# Patient Record
Sex: MALE | Employment: FULL TIME | URBAN - METROPOLITAN AREA
[De-identification: names, ages, dates, MRNs, and addresses within clinical notes are randomized per-mention and may not be internally consistent; named-entity substitution may affect disease eponyms.]

---

## 2017-02-07 ENCOUNTER — TELEPHONE (OUTPATIENT)
Dept: FAMILY MEDICINE CLINIC | Facility: CLINIC | Age: 37
End: 2017-02-07

## 2017-02-07 DIAGNOSIS — Z23 NEED FOR TDAP VACCINATION: Primary | ICD-10-CM

## 2017-02-07 NOTE — TELEPHONE ENCOUNTER
Future Appointments  Date Time Provider Quin Keating   2/10/2017 2:30 PM EMG 03 NURSE EMG 3 EMG Isela

## 2017-02-07 NOTE — TELEPHONE ENCOUNTER
Pt sent his immunization record to us and he wants to make sure that he is updated on all shots and if he isnt he would like to make appt to have any shots he happens to need.

## 2017-02-10 ENCOUNTER — NURSE ONLY (OUTPATIENT)
Dept: FAMILY MEDICINE CLINIC | Facility: CLINIC | Age: 37
End: 2017-02-10

## 2017-02-10 PROCEDURE — 90471 IMMUNIZATION ADMIN: CPT | Performed by: FAMILY MEDICINE

## 2017-02-10 PROCEDURE — 90715 TDAP VACCINE 7 YRS/> IM: CPT | Performed by: FAMILY MEDICINE

## 2017-04-09 ENCOUNTER — OFFICE VISIT (OUTPATIENT)
Dept: FAMILY MEDICINE CLINIC | Facility: CLINIC | Age: 37
End: 2017-04-09

## 2017-04-09 VITALS
SYSTOLIC BLOOD PRESSURE: 110 MMHG | HEART RATE: 72 BPM | DIASTOLIC BLOOD PRESSURE: 68 MMHG | OXYGEN SATURATION: 98 % | WEIGHT: 186 LBS | TEMPERATURE: 98 F | RESPIRATION RATE: 16 BRPM | BODY MASS INDEX: 32 KG/M2

## 2017-04-09 DIAGNOSIS — J01.00 ACUTE NON-RECURRENT MAXILLARY SINUSITIS: Primary | ICD-10-CM

## 2017-04-09 PROCEDURE — 99213 OFFICE O/P EST LOW 20 MIN: CPT | Performed by: PHYSICIAN ASSISTANT

## 2017-04-09 RX ORDER — AMOXICILLIN AND CLAVULANATE POTASSIUM 875; 125 MG/1; MG/1
1 TABLET, FILM COATED ORAL 2 TIMES DAILY
Qty: 20 TABLET | Refills: 0 | Status: SHIPPED | OUTPATIENT
Start: 2017-04-09 | End: 2017-04-19

## 2017-04-09 RX ORDER — FLUTICASONE PROPIONATE 50 MCG
2 SPRAY, SUSPENSION (ML) NASAL DAILY
Qty: 1 BOTTLE | Refills: 0 | Status: SHIPPED | OUTPATIENT
Start: 2017-04-09 | End: 2017-05-09

## 2017-04-09 NOTE — PROGRESS NOTES
CHIEF COMPLAINT:   Patient presents with:  URI      HPI:   Lance Rooney is a 44year old male who presents for upper respiratory symptoms for  4 days. Patient reports: sinus congestion, a lot of sinus pain, mild sore throat, cough.   No whee NEURO: Denies headaches    EXAM:   /68 mmHg  Pulse 72  Temp(Src) 98 °F (36.7 °C)  Resp 16  Wt 186 lb  SpO2 98%  GENERAL: well developed, well nourished,in no apparent distress  SKIN: no rashes,no suspicious lesions  HEAD: atraumatic, normocephalic. You may eat yogurt or take probiotics over the counter (such as Florastor or Align) if you would like to replace the good bacteria in your GI tract that can be removed by antibiotics. If you do take probiotics, take them between doses of the antibiotic. Sinuses are air-filled spaces in the skull behind the face. They are kept moist and clean by a lining of mucosa. Things such as pollen, smoke, and chemical fumes can irritate the mucosa. It can then become inflamed (swell up).  As a response to irritation,

## 2017-04-24 ENCOUNTER — TELEPHONE (OUTPATIENT)
Dept: FAMILY MEDICINE CLINIC | Facility: CLINIC | Age: 37
End: 2017-04-24

## 2017-04-25 ENCOUNTER — TELEPHONE (OUTPATIENT)
Dept: FAMILY MEDICINE CLINIC | Facility: CLINIC | Age: 37
End: 2017-04-25

## 2017-04-25 ENCOUNTER — OFFICE VISIT (OUTPATIENT)
Dept: FAMILY MEDICINE CLINIC | Facility: CLINIC | Age: 37
End: 2017-04-25

## 2017-04-25 ENCOUNTER — HOSPITAL ENCOUNTER (OUTPATIENT)
Dept: GENERAL RADIOLOGY | Age: 37
Discharge: HOME | End: 2017-04-25
Attending: NURSE PRACTITIONER
Payer: COMMERCIAL

## 2017-04-25 VITALS
SYSTOLIC BLOOD PRESSURE: 118 MMHG | TEMPERATURE: 98 F | DIASTOLIC BLOOD PRESSURE: 80 MMHG | WEIGHT: 183.19 LBS | HEIGHT: 63.5 IN | RESPIRATION RATE: 16 BRPM | HEART RATE: 76 BPM | BODY MASS INDEX: 32.06 KG/M2

## 2017-04-25 DIAGNOSIS — K59.00 CONSTIPATION, UNSPECIFIED CONSTIPATION TYPE: Primary | ICD-10-CM

## 2017-04-25 DIAGNOSIS — K62.89 ANAL OR RECTAL PAIN: ICD-10-CM

## 2017-04-25 DIAGNOSIS — K59.00 CONSTIPATION, UNSPECIFIED CONSTIPATION TYPE: ICD-10-CM

## 2017-04-25 PROCEDURE — 74000 XR ABDOMEN (1 VIEW) (CPT=74000): CPT

## 2017-04-25 PROCEDURE — 99213 OFFICE O/P EST LOW 20 MIN: CPT | Performed by: NURSE PRACTITIONER

## 2017-04-25 NOTE — PROGRESS NOTES
Katie Cates is a 44year old male. S:  Patient presents today with spouse for c/o constipation and pain with defecation in the last 2-3 days.  States he had a firm, normal appearing BM 2 days ago, but now hard and having difficulty evacu Further evaluation for constipation due to pain. Complete abdominal xray today. Take aleve 440 mg twice a day with food for pain. Consider rectal steroid foam pending xray results. Recommend liquid/soft diet at this time.       Constipation (Adult)  Cons All treatment should be done after talking with your healthcare provider. This is especially true if you have another medical problems, are taking prescription medicines, or are an older adult. Treatment most often involves lifestyle changes.  You may also · Pain in your abdomen or back gets worse  · Nausea or vomiting  · Swelling in your abdomen  · Blood in the stool  · Black, tarry stool  · Involuntary weight loss  · Weakness  Date Last Reviewed: 12/30/2015  © 2794-5224 [employer]

## 2017-04-25 NOTE — TELEPHONE ENCOUNTER
They did do the enema he had some results just now feeling slightly better told her to continue the miralax with increased fluids and if not better in 2 days call for an appointment wife agreed to this plan

## 2017-04-25 NOTE — TELEPHONE ENCOUNTER
See visit note from 4/25/17 for instructions, additional MyChart message was sent from abdominal xray results. Advised to f/u in 2-3 days if worsening or no success for constipation treatment.

## 2017-04-25 NOTE — TELEPHONE ENCOUNTER
Patient was told to contact office after 30 minutes of taking medication to see if he has been able to have a bowel movement. Spouse states no bowel movements only pain.

## 2017-04-25 NOTE — PATIENT INSTRUCTIONS
Further evaluation for constipation due to pain. Complete abdominal xray today. Take aleve 440 mg twice a day with food for pain. Consider rectal steroid foam pending xray results. Recommend liquid/soft diet at this time.       Constipation (Adult)  Cons All treatment should be done after talking with your healthcare provider. This is especially true if you have another medical problems, are taking prescription medicines, or are an older adult. Treatment most often involves lifestyle changes.  You may also · Pain in your abdomen or back gets worse  · Nausea or vomiting  · Swelling in your abdomen  · Blood in the stool  · Black, tarry stool  · Involuntary weight loss  · Weakness  Date Last Reviewed: 12/30/2015  © 9703-6305 [employer]

## 2017-06-19 ENCOUNTER — HOSPITAL ENCOUNTER (OUTPATIENT)
Dept: GENERAL RADIOLOGY | Age: 37
Discharge: HOME | End: 2017-06-19
Attending: PHYSICIAN ASSISTANT
Payer: COMMERCIAL

## 2017-06-19 ENCOUNTER — OFFICE VISIT (OUTPATIENT)
Dept: FAMILY MEDICINE CLINIC | Facility: CLINIC | Age: 37
End: 2017-06-19

## 2017-06-19 VITALS
TEMPERATURE: 98 F | DIASTOLIC BLOOD PRESSURE: 80 MMHG | SYSTOLIC BLOOD PRESSURE: 110 MMHG | HEIGHT: 64 IN | HEART RATE: 72 BPM | BODY MASS INDEX: 31.58 KG/M2 | RESPIRATION RATE: 16 BRPM | WEIGHT: 185 LBS

## 2017-06-19 DIAGNOSIS — M75.41 SHOULDER IMPINGEMENT, RIGHT: ICD-10-CM

## 2017-06-19 DIAGNOSIS — M75.41 SHOULDER IMPINGEMENT, RIGHT: Primary | ICD-10-CM

## 2017-06-19 DIAGNOSIS — K59.00 CONSTIPATION, UNSPECIFIED CONSTIPATION TYPE: ICD-10-CM

## 2017-06-19 DIAGNOSIS — L98.9 SKIN LESION OF FOOT: ICD-10-CM

## 2017-06-19 PROCEDURE — 99214 OFFICE O/P EST MOD 30 MIN: CPT | Performed by: PHYSICIAN ASSISTANT

## 2017-06-19 PROCEDURE — 73030 X-RAY EXAM OF SHOULDER: CPT | Performed by: PHYSICIAN ASSISTANT

## 2017-06-19 RX ORDER — NAPROXEN 500 MG/1
500 TABLET ORAL 2 TIMES DAILY WITH MEALS
Qty: 60 TABLET | Refills: 0 | Status: SHIPPED | OUTPATIENT
Start: 2017-06-19 | End: 2017-07-17

## 2017-06-19 NOTE — PROGRESS NOTES
CC:  Patient presents with:  Shoulder Pain: right shoulder pain started moderately last week and has increased in pain, limiting range of motion  Constipation: problem started last week, bowel movements are strained and bm is pellet shaped  Derm Problem: b omega-3 fatty acids (FISH OIL) 1000 MG Oral Cap Take 1,000 mg by mouth 3 (three) times daily. Disp:  Rfl:      No current facility-administered medications on file prior to visit.     Review of Systems:     Constitutional: No fatigue; normal energy; no we constipation, including increasing fluids, activity, and fiber. He will use the Miralax for a week and F/U as needed. He will apply warm compresses to the lesion on his foot and watch for changes. F/U in 5-7 days if it does not resolve; sooner as needed.

## 2017-06-19 NOTE — PATIENT INSTRUCTIONS
Treating Constipation    Constipation is a common and often uncomfortable problem. Constipation means you have bowel movements fewer than 3 times per week, or strain to pass hard, dry stool. It can last a short time.  Or it can be a problem that never see © 0118-6149 The [address] [place] [address], [address] [place]. All rights reserved. This information is not intended as a substitute for professional medical care. Always follow your healthcare professional's instructions.         Constip All treatment should be done after talking with your healthcare provider. This is especially true if you have another medical problems, are taking prescription medicines, or are an older adult. Treatment most often involves lifestyle changes.  You may also · Pain in your abdomen or back gets worse  · Nausea or vomiting  · Swelling in your abdomen  · Blood in the stool  · Black, tarry stool  · Involuntary weight loss  · Weakness  Date Last Reviewed: 12/30/2015  © 3511-2994 [employer]

## 2017-06-20 DIAGNOSIS — M75.41 IMPINGEMENT SYNDROME OF RIGHT SHOULDER: Primary | ICD-10-CM

## 2017-07-19 RX ORDER — NAPROXEN 500 MG/1
TABLET ORAL
Qty: 60 TABLET | Refills: 0 | Status: SHIPPED | OUTPATIENT
Start: 2017-07-19 | End: 2017-11-27

## 2017-08-28 ENCOUNTER — TELEPHONE (OUTPATIENT)
Dept: FAMILY MEDICINE CLINIC | Facility: CLINIC | Age: 37
End: 2017-08-28

## 2017-08-28 NOTE — TELEPHONE ENCOUNTER
Pt scheduled nurse visit for tomorrow for Hep A and B. Patient would like to know if there's any other immunizations he needs to have done.

## 2017-08-29 ENCOUNTER — NURSE ONLY (OUTPATIENT)
Dept: FAMILY MEDICINE CLINIC | Facility: CLINIC | Age: 37
End: 2017-08-29

## 2017-08-29 VITALS — TEMPERATURE: 98 F

## 2017-08-29 PROCEDURE — 90471 IMMUNIZATION ADMIN: CPT | Performed by: FAMILY MEDICINE

## 2017-08-29 PROCEDURE — 90746 HEPB VACCINE 3 DOSE ADULT IM: CPT | Performed by: FAMILY MEDICINE

## 2017-08-29 PROCEDURE — 90472 IMMUNIZATION ADMIN EACH ADD: CPT | Performed by: FAMILY MEDICINE

## 2017-08-29 PROCEDURE — 90632 HEPA VACCINE ADULT IM: CPT | Performed by: FAMILY MEDICINE

## 2017-08-29 NOTE — PROGRESS NOTES
Thor Awan presents for his Hepatitis A&B bothe #1. Hepatitis A given in LD IM and Hepatitis B given in RD IM per  order. Both injections tolerated well. Given VIS.  Will return in 1 month for Hepatitis B #2 and in 6 months for Hepatitis A #2 and Hepa

## 2017-09-29 ENCOUNTER — TELEPHONE (OUTPATIENT)
Dept: FAMILY MEDICINE CLINIC | Facility: CLINIC | Age: 37
End: 2017-09-29

## 2017-09-29 ENCOUNTER — NURSE ONLY (OUTPATIENT)
Dept: FAMILY MEDICINE CLINIC | Facility: CLINIC | Age: 37
End: 2017-09-29

## 2017-09-29 DIAGNOSIS — E78.1 HYPERTRIGLYCERIDEMIA: Primary | ICD-10-CM

## 2017-09-29 DIAGNOSIS — Z23 NEED FOR VACCINATION: Primary | ICD-10-CM

## 2017-09-29 PROCEDURE — 90471 IMMUNIZATION ADMIN: CPT | Performed by: FAMILY MEDICINE

## 2017-09-29 PROCEDURE — 90746 HEPB VACCINE 3 DOSE ADULT IM: CPT | Performed by: FAMILY MEDICINE

## 2017-09-29 NOTE — TELEPHONE ENCOUNTER
Patient is scheduled 10/30/17 for physical, patient would like to have blood work done prior to appointment.  Please place orders to [facility]

## 2017-09-29 NOTE — PROGRESS NOTES
Pt received Hep  B injection today, pt handled it well, advise to make appointment for next injection.

## 2017-11-08 DIAGNOSIS — E78.1 HYPERTRIGLYCERIDEMIA: ICD-10-CM

## 2017-11-09 RX ORDER — SIMVASTATIN 20 MG
TABLET ORAL
Qty: 90 TABLET | Refills: 0 | Status: SHIPPED | OUTPATIENT
Start: 2017-11-09 | End: 2018-02-05

## 2017-11-09 NOTE — TELEPHONE ENCOUNTER
Ok for 3 mo supply but then should schedule physical and get labs prior to any refills.    Sooner is better, as we are already overdue for annual.

## 2017-11-20 ENCOUNTER — APPOINTMENT (OUTPATIENT)
Dept: LAB | Age: 37
End: 2017-11-20
Attending: FAMILY MEDICINE
Payer: COMMERCIAL

## 2017-11-20 DIAGNOSIS — E78.1 HYPERTRIGLYCERIDEMIA: ICD-10-CM

## 2017-11-20 PROCEDURE — 80053 COMPREHEN METABOLIC PANEL: CPT

## 2017-11-20 PROCEDURE — 36415 COLL VENOUS BLD VENIPUNCTURE: CPT

## 2017-11-20 PROCEDURE — 80061 LIPID PANEL: CPT

## 2017-11-27 ENCOUNTER — OFFICE VISIT (OUTPATIENT)
Dept: FAMILY MEDICINE CLINIC | Facility: CLINIC | Age: 37
End: 2017-11-27

## 2017-11-27 VITALS
TEMPERATURE: 99 F | HEIGHT: 63.5 IN | DIASTOLIC BLOOD PRESSURE: 70 MMHG | BODY MASS INDEX: 33.25 KG/M2 | HEART RATE: 76 BPM | RESPIRATION RATE: 16 BRPM | SYSTOLIC BLOOD PRESSURE: 102 MMHG | WEIGHT: 190 LBS

## 2017-11-27 DIAGNOSIS — R51.9 INTRACTABLE HEADACHE, UNSPECIFIED CHRONICITY PATTERN, UNSPECIFIED HEADACHE TYPE: Primary | ICD-10-CM

## 2017-11-27 DIAGNOSIS — Z00.00 ROUTINE GENERAL MEDICAL EXAMINATION AT A HEALTH CARE FACILITY: ICD-10-CM

## 2017-11-27 DIAGNOSIS — E78.1 PURE HYPERGLYCERIDEMIA: ICD-10-CM

## 2017-11-27 DIAGNOSIS — E78.1 HYPERTRIGLYCERIDEMIA: ICD-10-CM

## 2017-11-27 DIAGNOSIS — L98.9 SKIN DISORDER: ICD-10-CM

## 2017-11-27 PROCEDURE — 99213 OFFICE O/P EST LOW 20 MIN: CPT | Performed by: PHYSICIAN ASSISTANT

## 2017-11-27 PROCEDURE — 99395 PREV VISIT EST AGE 18-39: CPT | Performed by: PHYSICIAN ASSISTANT

## 2017-11-30 ENCOUNTER — HOSPITAL ENCOUNTER (OUTPATIENT)
Dept: CT IMAGING | Facility: HOSPITAL | Age: 37
Discharge: HOME | End: 2017-11-30
Attending: PHYSICIAN ASSISTANT
Payer: COMMERCIAL

## 2017-11-30 DIAGNOSIS — R51.9 INTRACTABLE HEADACHE, UNSPECIFIED CHRONICITY PATTERN, UNSPECIFIED HEADACHE TYPE: ICD-10-CM

## 2017-11-30 PROCEDURE — 70450 CT HEAD/BRAIN W/O DYE: CPT | Performed by: PHYSICIAN ASSISTANT

## 2017-12-04 ENCOUNTER — TELEPHONE (OUTPATIENT)
Dept: FAMILY MEDICINE CLINIC | Facility: CLINIC | Age: 37
End: 2017-12-04

## 2017-12-04 DIAGNOSIS — R51.9 INTRACTABLE HEADACHE, UNSPECIFIED CHRONICITY PATTERN, UNSPECIFIED HEADACHE TYPE: Primary | ICD-10-CM

## 2017-12-04 NOTE — TELEPHONE ENCOUNTER
Referral placed; please provide him with [facility] neuro contact info.

## 2017-12-04 NOTE — TELEPHONE ENCOUNTER
Mark Knife  [address]  [address] [place] [address]  [phone]      [facility] to call back to get referral name

## 2017-12-04 NOTE — TELEPHONE ENCOUNTER
----- Message from Angelita Pisano PA-C sent at 12/1/2017  8:49 AM CST -----  Negative CT. I will refer to neuro if he desires.

## 2018-01-16 ENCOUNTER — OFFICE VISIT (OUTPATIENT)
Dept: NEUROLOGY | Facility: CLINIC | Age: 38
End: 2018-01-16

## 2018-01-16 VITALS
HEIGHT: 64 IN | DIASTOLIC BLOOD PRESSURE: 78 MMHG | RESPIRATION RATE: 16 BRPM | WEIGHT: 198 LBS | HEART RATE: 84 BPM | SYSTOLIC BLOOD PRESSURE: 116 MMHG | BODY MASS INDEX: 33.8 KG/M2

## 2018-01-16 DIAGNOSIS — G44.219 EPISODIC TENSION-TYPE HEADACHE, NOT INTRACTABLE: Primary | ICD-10-CM

## 2018-01-16 PROCEDURE — 99204 OFFICE O/P NEW MOD 45 MIN: CPT | Performed by: OTHER

## 2018-01-16 RX ORDER — TOPIRAMATE 25 MG/1
TABLET ORAL
Qty: 60 TABLET | Refills: 1 | Status: SHIPPED | OUTPATIENT
Start: 2018-01-16 | End: 2018-03-05

## 2018-01-16 NOTE — PROGRESS NOTES
Neurology H&P    Quique 52 Patient Status:  No patient class for patient encounter    1980 MRN [id number]   Location [address] [facility], [address] [place] [address] Drive, [address] [place] [address] Attending No att. providers found   Hosp Day # 0 PCP José Luis Stevens Problem List:  Patient Active Problem List:     Skin disorder     Pure hyperglyceridemia     Hypertriglyceridemia      PMHx:  Past Medical History:   Diagnosis Date   • Localized superficial swelling, mass, or lump 7/6/12    multiple skin nodules   • elevation intact, no dysarthria, no tongue fasciculations or atrophy, strong shoulder shrug.     MOTOR EXAMINATION: normal tone, no fasciculations, normal strength throughout in UEs and LEs     SENSORY EXAMINATION:  UE: intact to light touch, pinprick intac type  - Topamax 25 mg PO QHS and we will increase this to 50mg QHS after 1 week       - We discussed possible side effects in detail       - Pt will call back to clinic in 5-10 days and let me know how they are tolerating this medication  - CTH imagine was

## 2018-01-16 NOTE — PATIENT INSTRUCTIONS
Refill policies:    • Allow 2-3 business days for refills; controlled substances may take longer.   • Contact your pharmacy at least 5 days prior to running out of medication and have them send an electronic request or submit request through the [facility] recommended that you have a procedure or additional testing performed. [facility]) will contact your insurance carrier to obtain pre-certification or prior authorization.     Unfortunately, [facility] has seen an increase in denial of paym

## 2018-02-05 ENCOUNTER — TELEPHONE (OUTPATIENT)
Dept: NEUROLOGY | Facility: CLINIC | Age: 38
End: 2018-02-05

## 2018-02-05 DIAGNOSIS — E78.1 HYPERTRIGLYCERIDEMIA: ICD-10-CM

## 2018-02-05 NOTE — TELEPHONE ENCOUNTER
S:  Pt calling to report he has started to experience tingling in his fingers since increasing his dose of topamax to 50mg nightly.   Tingling started as short burst about 4 days ago, but now has become more persistent and is interfering with normal functio

## 2018-02-06 RX ORDER — SIMVASTATIN 20 MG
TABLET ORAL
Qty: 90 TABLET | Refills: 1 | Status: SHIPPED | OUTPATIENT
Start: 2018-02-06 | End: 2018-07-12

## 2018-03-05 ENCOUNTER — OFFICE VISIT (OUTPATIENT)
Dept: NEUROLOGY | Facility: CLINIC | Age: 38
End: 2018-03-05

## 2018-03-05 VITALS
BODY MASS INDEX: 33 KG/M2 | SYSTOLIC BLOOD PRESSURE: 118 MMHG | HEART RATE: 81 BPM | WEIGHT: 192 LBS | DIASTOLIC BLOOD PRESSURE: 78 MMHG | RESPIRATION RATE: 16 BRPM

## 2018-03-05 DIAGNOSIS — G44.219 EPISODIC TENSION-TYPE HEADACHE, NOT INTRACTABLE: Primary | ICD-10-CM

## 2018-03-05 PROCEDURE — 99213 OFFICE O/P EST LOW 20 MIN: CPT | Performed by: OTHER

## 2018-03-05 RX ORDER — BUTALBITAL, ACETAMINOPHEN AND CAFFEINE 300; 40; 50 MG/1; MG/1; MG/1
1 CAPSULE ORAL EVERY 4 HOURS PRN
Qty: 20 CAPSULE | Refills: 0 | Status: SHIPPED | OUTPATIENT
Start: 2018-03-05 | End: 2018-03-19

## 2018-03-05 NOTE — PROGRESS NOTES
Neurology H&P    Quique Yang Patient Status:  No patient class for patient encounter    1980 MRN [id number]   Location ED [facility], [address], [address] [facility] Attending No att. providers found   Hosp Day # 0 PCP Bobby Atkins that this is usually due to lack of sleep.  He did try to take 50-mg PO QHS of his Topamax but tells me that he had a significant amount of tingling in his feet that accompanied this higher does and so reduced it to 25mg PO QHS and that this reduced any tin Genetic Disease Son      gene mutation - pelvic kidney   • Neurological Disorder Son      seizures       ROS:  Gen: no unexplained weight loss  Vision: no vision changes, no new blurry or double vision  Head and Neck: no eye or ear discharge  Pulmonary: no size.    INTRACRANIAL:  There are no abnormal extraaxial fluid collections. There is no midline shift. There are no intraparenchymal brain abnormalities. There is nothing specific for acute infarct. There is no hemorrhage or significant mass lesion.   Sushila Johnson DO  Neurology

## 2018-03-05 NOTE — PATIENT INSTRUCTIONS
Refill policies:    • Allow 2-3 business days for refills; controlled substances may take longer.   • Contact your pharmacy at least 5 days prior to running out of medication and have them send an electronic request or submit request through the [facility] recommended that you have a procedure or additional testing performed. [facility]) will contact your insurance carrier to obtain pre-certification or prior authorization.     Unfortunately, [facility] has seen an increase in denial of paym

## 2018-03-06 ENCOUNTER — TELEPHONE (OUTPATIENT)
Dept: NEUROLOGY | Facility: CLINIC | Age: 38
End: 2018-03-06

## 2018-03-06 NOTE — TELEPHONE ENCOUNTER
Rx for Fioricet printed and signed by Dr. Kari Georges after OV. Faxed to preferred pharmacy at above number. Fax confirmation received.

## 2018-03-07 ENCOUNTER — TELEPHONE (OUTPATIENT)
Dept: SURGERY | Facility: CLINIC | Age: 38
End: 2018-03-07

## 2018-03-07 NOTE — TELEPHONE ENCOUNTER
Pharmacist informed that they are requesting PA for Fioricet with Codeine and Fioricet was not ordered with Codeine. Fioricet without codeine does go through without needing a PA.

## 2018-04-19 NOTE — PROGRESS NOTES
[facility] Emergency Department    [address] [place] [address]    Phone:  [phone]    Fax:  [phone]                                       Tana Hernandez   MRN: [id number]    Department:  [facility] Emergency Department   Date of Visit:  4/19/2018           Patient Information     Date Of Birth          2000        Your diagnoses for this visit were:     Laceration of right lower extremity, initial encounter        You were seen by Romaine Vizcarra MD.      Follow-up Information     Follow up with Deana Wood APRN CNP In 1 week.    Specialty:  Nurse Practitioner - Family    Why:  ER follow up, For suture removal    Contact information:    [address] [facility]   [place] [address] [phone]          Discharge Instructions         Extremity Laceration: Stitches, Staples, or Tape  A laceration is a cut through the skin. If it is deep, it may require stitches or staples to close so it can heal. Minor cuts may be treated with surgical tape closures, or skin glue.  X-rays may be done if something may have entered the skin through the cut. You may also need a tetanus shot if you are not up to date on this vaccine.  Home care    Follow the healthcare provider s instructions on how to care for the cut.    Wash your hands with soap and warm water before and after caring for your wound. This is to help prevent infection.    Keep the wound clean and dry. If a bandage was applied and it becomes wet or dirty, replace it. Otherwise, leave it in place for the first 24 hours, then change it once a day or as directed.    If stitches or staples were used, clean the wound daily:  ? After removing the bandage, wash the area with soap and water. Use a wet cotton swab to loosen and remove any blood or crust that forms.  ? After cleaning, keep the wound clean and dry. Talk with your healthcare provider before putting any antibiotic ointment on the wound. Reapply the  Radha Phillip is a 44year old male who presents for a complete physical exam. He has a recurrent spot on his scalp that he has been to see dermatology for twice; both providers were in the same office and both prescribed the same medication, which has not helped. Value Date   TSH 1.41 12/17/2014   TSHT4 1.48 02/04/2014     No results found for: PSA      Current Outpatient Prescriptions:  omega-3 fatty acids (FISH OIL) 1000 MG Oral Cap Take 1,000 mg by mouth 3 (three) times daily.  Disp:  Rfl:    SIMVASTATIN 20 MG Or bandage.    You may remove the bandage to shower as usual after the first 24 hours, but don't soak the area in water (no swimming) until the stitches or staples are removed.    If surgical tape closures were used, keep the area clean and dry. If it becomes wet, blot it dry with a towel. Let the surgical tape fall off on its own.    The healthcare provider may prescribe an antibiotic cream or ointment to prevent infection. He or she may also prescribe an antibiotic pill. Don't stop taking this medicine until you have finished it all or the provider tells you to stop.    The provider may also prescribe medicine for pain. Follow the instructions for taking these medicines.    Don't do activities that may reopen your wound.  Follow-up care  Follow up with your healthcare provider, or as advised. Most skin wounds heal within 10 days. But an infection may sometimes occur even with proper treatment. Check the wound daily for the signs of infection listed below. Stitches and staples should be removed within 7 to14 days. If surgical tape closures were used, you may remove them after 10 days if they have not fallen off by then.   When to seek medical advice  Call your healthcare provider right away if any of these occur:    Wound bleeding not controlled by direct pressure    Signs of infection, including increasing pain in the wound, increasing wound redness or swelling, or pus or bad odor coming from the wound    Fever of 100.4 F (38 C) or higher, or as directed by your healthcare provider    Stitches or staples come apart or fall out or surgical tape falls off before 7 days    Wound edges reopen    Wound changes colors    Numbness occurs around the wound     Decreased movement around the injured area  Date Last Reviewed: 7/1/2017 2000-2017 The [employer]. [address] [place] [address]. All rights reserved. This information is not intended as a substitute for professional medical care. Always follow  thyromegaly  Lungs: Clear to auscultation w/o wheezes, rales, or rhonchi; no labored breathing  Cardio: RRR without murmur or extra heart sounds; no carotid bruits; no peripheral edema  GI: Normal BS; no hernias, HSM, or TTP; no abdominal bruits  : Defer your healthcare professional's instructions.          24 Hour Appointment Hotline       To make an appointment at any [facility], call [phone] ([phone]). If you don't have a family doctor or clinic, we will help you find one. [facility] clinics are conveniently located to serve the needs of you and your family.             Review of your medicines      Our records show that you are taking the medicines listed below. If these are incorrect, please call your family doctor or clinic.        Dose / Directions Last dose taken    amphetamine-dextroamphetamine 10 MG per tablet   Commonly known as:  ADDERALL   Dose:  10 mg   Quantity:  60 tablet        Take 1 tablet (10 mg) by mouth 2 times daily   Refills:  0        FLUoxetine 10 MG capsule   Commonly known as:  PROzac   Dose:  10 mg        10 mg 1 capsule by mouth daily   Refills:  1                Orders Needing Specimen Collection     None      Pending Results     No orders found from 4/17/2018 to 4/20/2018.            Pending Culture Results     No orders found from 4/17/2018 to 4/20/2018.            Pending Results Instructions     If you had any lab results that were not finalized at the time of your Discharge, you can call the ED Lab Result RN at [phone]. You will be contacted by this team for any positive Lab results or changes in treatment. The nurses are available 7 days a week from 10A to 6:30P.  You can leave a message 24 hours per day and they will return your call.        Thank you for choosing [facility]       Thank you for choosing [facility] for your care. Our goal is always to provide you with excellent care. Hearing back from our patients is one way we can continue to improve our services. Please take a few minutes to complete the written survey that you may receive in the mail after you visit with us. Thank you!        [employer] Information     [employer] lets you send messages to your doctor, view your test results, renew your  "prescriptions, schedule appointments and more. To sign up, go to [email or web address] . Click on \"Log in\" on the left side of the screen, which will take you to the Welcome page. Then click on \"Sign up Now\" on the right side of the page.     You will be asked to enter the access code listed below, as well as some personal information. Please follow the directions to create your username and password.     Your access code is: [email or web address]  Expires: 2018  8:02 PM     Your access code will  in 90 days. If you need help or a new code, please call your [facility] clinic or [phone].        Care EveryWhere ID     This is your Care EveryWhere ID. This could be used by other organizations to access your [facility] medical records  [id number]        Equal Access to Services     DOMENIC OLIVIA : Rasheeda Alex, clare chow, jhon putnam, lasha mcbride . So [facility] [phone].    ATENCIÓN: Si habla español, tiene a greene disposición servicios gratuitos de asistencia lingüística. Llame al [phone].    We comply with applicable federal civil rights laws and [place] laws. We do not discriminate on the basis of race, color, national origin, age, disability, sex, sexual orientation, or gender identity.            After Visit Summary       This is your record. Keep this with you and show to your community pharmacist(s) and doctor(s) at your next visit.                  "

## 2018-05-23 DIAGNOSIS — G44.219 EPISODIC TENSION-TYPE HEADACHE, NOT INTRACTABLE: Primary | ICD-10-CM

## 2018-05-24 RX ORDER — BUTALBITAL, ACETAMINOPHEN AND CAFFEINE 300; 40; 50 MG/1; MG/1; MG/1
1 CAPSULE ORAL EVERY 6 HOURS PRN
Qty: 20 CAPSULE | Refills: 0 | Status: SHIPPED
Start: 2018-05-24 | End: 2018-06-05

## 2018-05-24 NOTE — TELEPHONE ENCOUNTER
Medication: Fioricet     Date of last refill: 3/5/18  [place]    Last office visit: 3/5/2018  Due back to clinic per last office note: 3 months   Date next office visit scheduled:  Future Appointments  Date Time Provi

## 2018-06-05 ENCOUNTER — OFFICE VISIT (OUTPATIENT)
Dept: NEUROLOGY | Facility: CLINIC | Age: 38
End: 2018-06-05

## 2018-06-05 VITALS
BODY MASS INDEX: 31.76 KG/M2 | WEIGHT: 186 LBS | DIASTOLIC BLOOD PRESSURE: 80 MMHG | RESPIRATION RATE: 16 BRPM | SYSTOLIC BLOOD PRESSURE: 100 MMHG | HEART RATE: 72 BPM | HEIGHT: 64 IN

## 2018-06-05 DIAGNOSIS — G44.219 EPISODIC TENSION-TYPE HEADACHE, NOT INTRACTABLE: ICD-10-CM

## 2018-06-05 PROCEDURE — 99213 OFFICE O/P EST LOW 20 MIN: CPT | Performed by: OTHER

## 2018-06-05 RX ORDER — BUTALBITAL, ACETAMINOPHEN AND CAFFEINE 300; 40; 50 MG/1; MG/1; MG/1
1 CAPSULE ORAL EVERY 6 HOURS PRN
Qty: 30 CAPSULE | Refills: 0 | Status: SHIPPED | OUTPATIENT
Start: 2018-06-05 | End: 2018-11-30 | Stop reason: ALTCHOICE

## 2018-06-05 NOTE — PATIENT INSTRUCTIONS
Refill policies:    • Allow 2-3 business days for refills; controlled substances may take longer.   • Contact your pharmacy at least 5 days prior to running out of medication and have them send an electronic request or submit request through the “request re entire amount billed. Precertification and Prior Authorizations: If your physician has recommended that you have a procedure or additional testing performed.   [facility]) will contact your insurance carrier to obtain pre-certi

## 2018-06-05 NOTE — PROGRESS NOTES
Neurology H&P    Quique Yang Patient Status:  No patient class for patient encounter    1980 MRN [id number]   Location [address] [place] [address], [address] [place] [address], [address] [place] [address] Attending No att. providers found   Hosp Day # 0 PCP Agustin Winters back to clinic for follow up today. He states that he has been having headaches only once every few weeks, He states that he did start his month of fasting and that this seemed to cause a worsening of his headaches.  He states that he has taken fioricet and Alcohol use:  No                Family History:  Family History   Problem Relation Age of Onset   • Hypertension Mother    • Genetic Disease Son      gene mutation - pelvic kidney   • Neurological Disorder Son      seizures       ROS:  Gen: no unexplained 11/30/17  FINDINGS:    VENTRICLES/SULCI:  Ventricles and sulci are normal in size. INTRACRANIAL:  There are no abnormal extraaxial fluid collections. There is no midline shift. There are no intraparenchymal brain abnormalities.   There is nothing speci

## 2018-06-06 ENCOUNTER — TELEPHONE (OUTPATIENT)
Dept: NEUROLOGY | Facility: CLINIC | Age: 38
End: 2018-06-06

## 2018-06-06 NOTE — TELEPHONE ENCOUNTER
Rx for Fioricet printed and signed by Dr. Janie Vargas after OV on 6/5/18. Faxed to preferred pharmacy at above number. Fax confirmation received.

## 2018-07-12 DIAGNOSIS — E78.1 HYPERTRIGLYCERIDEMIA: ICD-10-CM

## 2018-07-14 RX ORDER — SIMVASTATIN 20 MG
TABLET ORAL
Qty: 90 TABLET | Refills: 1 | Status: SHIPPED | OUTPATIENT
Start: 2018-07-14 | End: 2019-10-08

## 2018-08-16 ENCOUNTER — TELEPHONE (OUTPATIENT)
Dept: FAMILY MEDICINE CLINIC | Facility: CLINIC | Age: 38
End: 2018-08-16

## 2018-08-16 DIAGNOSIS — E78.1 HYPERTRIGLYCERIDEMIA: Primary | ICD-10-CM

## 2018-08-16 NOTE — TELEPHONE ENCOUNTER
Pt scheduled physical with Dr. Marisol Loza on 9/10/18 please order labs to  [facility]. Thank you.

## 2018-08-20 NOTE — TELEPHONE ENCOUNTER
Spoke with patient letting him know that his labs have been entered and to have fasting prior to appt. Pt verbalized understanding.

## 2018-09-11 ENCOUNTER — TELEPHONE (OUTPATIENT)
Dept: FAMILY MEDICINE CLINIC | Facility: CLINIC | Age: 38
End: 2018-09-11

## 2018-09-11 NOTE — TELEPHONE ENCOUNTER
Patient scheduled physical with Dr. aMrisol Loza on 11/30/18. Please call labs into [facility]. Thank you.

## 2018-11-27 ENCOUNTER — APPOINTMENT (OUTPATIENT)
Dept: LAB | Age: 38
End: 2018-11-27
Attending: FAMILY MEDICINE
Payer: COMMERCIAL

## 2018-11-27 DIAGNOSIS — E78.1 HYPERTRIGLYCERIDEMIA: ICD-10-CM

## 2018-11-27 PROCEDURE — 80061 LIPID PANEL: CPT

## 2018-11-27 PROCEDURE — 80053 COMPREHEN METABOLIC PANEL: CPT

## 2018-11-30 ENCOUNTER — OFFICE VISIT (OUTPATIENT)
Dept: FAMILY MEDICINE CLINIC | Facility: CLINIC | Age: 38
End: 2018-11-30

## 2018-11-30 VITALS
BODY MASS INDEX: 33.39 KG/M2 | HEIGHT: 63.8 IN | SYSTOLIC BLOOD PRESSURE: 90 MMHG | RESPIRATION RATE: 14 BRPM | HEART RATE: 80 BPM | DIASTOLIC BLOOD PRESSURE: 60 MMHG | WEIGHT: 193.19 LBS | TEMPERATURE: 98 F

## 2018-11-30 DIAGNOSIS — L67.9 HAIR CHANGES: ICD-10-CM

## 2018-11-30 DIAGNOSIS — E78.1 HYPERTRIGLYCERIDEMIA: ICD-10-CM

## 2018-11-30 DIAGNOSIS — Z00.00 ANNUAL PHYSICAL EXAM: Primary | ICD-10-CM

## 2018-11-30 PROCEDURE — 99395 PREV VISIT EST AGE 18-39: CPT | Performed by: FAMILY MEDICINE

## 2018-11-30 NOTE — PROGRESS NOTES
Patient presents with:  Physical  Derm Problem: still having issue with spot on scalp- saw DERM and 5 differrent Abx have been tried- no success     HPI:   Kimberlyn Krueger is a 43year old male who presents for a complete physical exam.     Nik Reeves Disp: 90 tablet Rfl: 1   omega-3 fatty acids (FISH OIL) 1000 MG Oral Cap Take 1,000 mg by mouth 3 (three) times daily.  Disp:  Rfl:       Past Medical History:   Diagnosis Date   • Localized superficial swelling, mass, or lump 7/6/12    multiple skin nodule oriented X 3, normal strength and tone. Normal symmetric reflexes.  Normal coordination and gait  Skin: L occiput -1cm circular area on scalp with minimal hair growth    ASSESSMENT AND PLAN:     ODILIA [facility] was seen in the office today:  ha

## 2018-12-20 ENCOUNTER — LAB ENCOUNTER (OUTPATIENT)
Dept: LAB | Age: 38
End: 2018-12-20
Attending: NURSE PRACTITIONER
Payer: COMMERCIAL

## 2018-12-20 ENCOUNTER — OFFICE VISIT (OUTPATIENT)
Dept: INTERNAL MEDICINE CLINIC | Facility: CLINIC | Age: 38
End: 2018-12-20

## 2018-12-20 VITALS
SYSTOLIC BLOOD PRESSURE: 110 MMHG | RESPIRATION RATE: 16 BRPM | BODY MASS INDEX: 33.77 KG/M2 | WEIGHT: 193 LBS | DIASTOLIC BLOOD PRESSURE: 70 MMHG | HEIGHT: 63.5 IN | HEART RATE: 68 BPM

## 2018-12-20 DIAGNOSIS — E78.2 MIXED HYPERLIPIDEMIA: ICD-10-CM

## 2018-12-20 DIAGNOSIS — Z51.81 ENCOUNTER FOR THERAPEUTIC DRUG MONITORING: ICD-10-CM

## 2018-12-20 DIAGNOSIS — E66.9 OBESITY (BMI 30.0-34.9): ICD-10-CM

## 2018-12-20 DIAGNOSIS — Z51.81 ENCOUNTER FOR THERAPEUTIC DRUG MONITORING: Primary | ICD-10-CM

## 2018-12-20 DIAGNOSIS — R94.31 NONSPECIFIC ABNORMAL ELECTROCARDIOGRAM (ECG) (EKG): ICD-10-CM

## 2018-12-20 PROBLEM — E66.811 OBESITY (BMI 30.0-34.9): Status: ACTIVE | Noted: 2018-12-20

## 2018-12-20 PROCEDURE — 83036 HEMOGLOBIN GLYCOSYLATED A1C: CPT

## 2018-12-20 PROCEDURE — 99204 OFFICE O/P NEW MOD 45 MIN: CPT | Performed by: NURSE PRACTITIONER

## 2018-12-20 PROCEDURE — 84443 ASSAY THYROID STIM HORMONE: CPT

## 2018-12-20 PROCEDURE — 85025 COMPLETE CBC W/AUTO DIFF WBC: CPT

## 2018-12-20 PROCEDURE — 82397 CHEMILUMINESCENT ASSAY: CPT

## 2018-12-20 PROCEDURE — 82607 VITAMIN B-12: CPT

## 2018-12-20 PROCEDURE — 82306 VITAMIN D 25 HYDROXY: CPT

## 2018-12-20 PROCEDURE — 93000 ELECTROCARDIOGRAM COMPLETE: CPT | Performed by: NURSE PRACTITIONER

## 2018-12-20 RX ORDER — PHENTERMINE HYDROCHLORIDE 15 MG/1
15 CAPSULE ORAL EVERY MORNING
Qty: 30 CAPSULE | Refills: 0 | Status: SHIPPED | OUTPATIENT
Start: 2018-12-20 | End: 2019-01-23

## 2018-12-20 NOTE — PROGRESS NOTES
HISTORY OF PRESENT ILLNESS  Patient presents with:  Weight Problem: referred by Dr Quan Kahn is a 43year old male new to our office today for initiation of medical weight loss program.  Patient presents today with c/o excess denies any rashes to skin folds  HEENT: no snoring  LUNGS: denies shortness of breath with exertion, no apnea  CARDIOVASCULAR: denies chest pain on exertion, denies palpitations or pedal edema  GI: denies abdominal pain.   No N/V/D/C  MUSCULOSKELETAL: denie 11/27/2018    TP 7.1 11/27/2018    ALB 3.7 11/27/2018    GLOBULT 2.8 01/13/2016    GLOBULIN 3.4 11/27/2018    ALBGLOBRAT 1.6 01/13/2016     11/27/2018    K 4.0 11/27/2018     11/27/2018    CO2 26.0 11/27/2018     Lab Results   Component Value D CBC WITH DIFFERENTIAL WITH PLATELET; Future  -     Phentermine HCl 15 MG Oral Cap;  Take 1 capsule (15 mg total) by mouth every morning.  -     OP REFERRAL TO [facility] ([facility] USE ONLY)    Nonspecific abnormal EKG  - see CV exam above  - pt asymptomat and/or juice if consumed. 2.  Eat breakfast daily and focus on having protein with each meal, examples include: greek yogurt, cottage cheese, hard boiled egg, whole grain toast with peanut butter.   3.  Reduce carbohydrates which includes sugar items such APRN  12/19/2018

## 2018-12-20 NOTE — PATIENT INSTRUCTIONS
Welcome to the [facility]...your Lifestyle Renovation begins now! Thank you for placing your trust in our health care team, I look forward to working with you along this journey to better health!     Next steps:     1.  Sched establish a routine. If not already exercising begin with 1 day and progress as able with long-term goal of 30 minutes 5 days a week at a minimum. Meditation daily can help manage and control stress. Chronic stress can make weight loss difficult.   Exerc

## 2019-01-22 ENCOUNTER — OFFICE VISIT (OUTPATIENT)
Dept: INTERNAL MEDICINE CLINIC | Facility: CLINIC | Age: 39
End: 2019-01-22

## 2019-01-22 VITALS — WEIGHT: 191.19 LBS | HEIGHT: 63.5 IN | BODY MASS INDEX: 33.46 KG/M2

## 2019-01-22 DIAGNOSIS — E66.09 CLASS 1 OBESITY DUE TO EXCESS CALORIES WITH BODY MASS INDEX (BMI) OF 33.0 TO 33.9 IN ADULT, UNSPECIFIED WHETHER SERIOUS COMORBIDITY PRESENT: Primary | ICD-10-CM

## 2019-01-22 PROCEDURE — 97802 MEDICAL NUTRITION INDIV IN: CPT | Performed by: DIETITIAN, REGISTERED

## 2019-01-22 NOTE — PROGRESS NOTES
INITIAL OUTPATIENT NUTRITION CONSULTATION    Nutrition Assessment    Medical Diagnosis: Obesity    Physical Findings: none reported     Client Age and Gender: 43year old    Marital Status and Occupation: , 2 kids (ages 9 and 8), works as an en mg/dL           LDL-CHOLESTEROL   Date Value Ref Range Status   01/13/2016 132 (H) <130 mg/dL (calc) Final     Comment:        Desirable range <100 mg/dL for patients with CHD or  diabetes and <70 mg/dL for diabetic patients with  known heart disease. skips  Dinner: largest meal/often late at night - protein/veggie/starch   Snacks: pop corn, fruit   Beverages: 8 glasses water per day, 1 - 2 cups coffee     Meal pattern: 2 - 3 meals/d, 1 - 2 snacks/d    Number of meals/week eaten at restaurants: not disc

## 2019-01-23 ENCOUNTER — OFFICE VISIT (OUTPATIENT)
Dept: INTERNAL MEDICINE CLINIC | Facility: CLINIC | Age: 39
End: 2019-01-23

## 2019-01-23 VITALS
HEART RATE: 76 BPM | BODY MASS INDEX: 32.72 KG/M2 | RESPIRATION RATE: 16 BRPM | WEIGHT: 187 LBS | DIASTOLIC BLOOD PRESSURE: 68 MMHG | HEIGHT: 63.5 IN | SYSTOLIC BLOOD PRESSURE: 122 MMHG

## 2019-01-23 DIAGNOSIS — E55.9 VITAMIN D DEFICIENCY: ICD-10-CM

## 2019-01-23 DIAGNOSIS — Z51.81 ENCOUNTER FOR THERAPEUTIC DRUG MONITORING: Primary | ICD-10-CM

## 2019-01-23 DIAGNOSIS — R73.03 PREDIABETES: ICD-10-CM

## 2019-01-23 DIAGNOSIS — E66.9 OBESITY (BMI 30.0-34.9): ICD-10-CM

## 2019-01-23 DIAGNOSIS — E53.8 VITAMIN B12 DEFICIENCY: ICD-10-CM

## 2019-01-23 PROCEDURE — 99214 OFFICE O/P EST MOD 30 MIN: CPT | Performed by: NURSE PRACTITIONER

## 2019-01-23 RX ORDER — METFORMIN HYDROCHLORIDE 750 MG/1
1500 TABLET, EXTENDED RELEASE ORAL
Qty: 60 TABLET | Refills: 5 | Status: SHIPPED | OUTPATIENT
Start: 2019-01-23 | End: 2019-09-13

## 2019-01-23 RX ORDER — PHENTERMINE HYDROCHLORIDE 15 MG/1
15 CAPSULE ORAL EVERY MORNING
Qty: 30 CAPSULE | Refills: 0 | Status: SHIPPED | OUTPATIENT
Start: 2019-01-23 | End: 2019-02-26

## 2019-01-23 NOTE — PROGRESS NOTES
Sav Funes is a 43year old male presents today for 1 month follow-up on medical weight loss program for the treatment of overweight, obesity, or morbid obesity with associated prediabetes, Hyperlipidemia, Vitamin B12 and D deficiency. PSYCH: pleasant, cooperative, normal mood and affect    ASSESSMENT AND PLAN:  Encounter for therapeutic drug monitoring  (primary encounter diagnosis)  Obesity (bmi 30.0-34. 9)  Prediabetes  Vitamin d deficiency  Vitamin b12 deficiency    No orders of the d Personal Training/Fitness Classes    · [facility] and [employer] @ [email or web address] Full fitness center with group fitness and personal training- ask for Steffany Bryant @ [phone] or [email or web address]. or You have been diagnosed with prediabetes. This means that the level of sugar (glucose) in your blood is too high. If you have prediabetes, you are at risk for developing type 2 diabetes.  Type 2 diabetes is diagnosed when the level of glucose in the blood r · Fasting glucose test. Blood is taken and tested after you have fasted (not eaten) for at least 8 hours. A normal test result is 99 milligrams per deciliter (mg/dL) or lower. Prediabetes is 100 mg/dL to 125 mg/dL. Diabetes is 126 mg/dL or higher.   · Gluco © 7636-6217 The [address] [place] [address], [address]. All rights reserved. This information is not intended as a substitute for professional medical care. Always follow your healthcare professional's instructions.             Med

## 2019-01-23 NOTE — PATIENT INSTRUCTIONS
Congratulations on your 6# weight loss! Continue making lifestyle changes that focus on good nutrition, regular exercise and stress management.     Today we reviewed your labs with findings of: prediabetes, Vitamin D deficiency- continue on Vitamin D weekly · [employer] (healthy meals on the go) in [facility] @ www. [email or web address]  · [employer], [employer], Home  - on line meal delivery programs  · [employer] in [place] for homemade meals to go @ www.[employer]. [employer]  · Diet Doctor @ www. dietdoctor. com · A family history of type 2 diabetes  · Being overweight  · Being over age 39  · Have hypertension or elevated cholesterol   · Having had gestational diabetes  · Not being physically active  · Being [place] American, [place] American, , [place] If it is untreated, prediabetes can turn into diabetes. This is a serious health condition. Take steps to stop this from happening. Follow the treatment plan you have been given. You may have your blood glucose tested again in about 12 to 18 months.   Sympt

## 2019-02-26 ENCOUNTER — OFFICE VISIT (OUTPATIENT)
Dept: INTERNAL MEDICINE CLINIC | Facility: CLINIC | Age: 39
End: 2019-02-26

## 2019-02-26 VITALS
WEIGHT: 185 LBS | SYSTOLIC BLOOD PRESSURE: 110 MMHG | HEIGHT: 63.5 IN | DIASTOLIC BLOOD PRESSURE: 60 MMHG | BODY MASS INDEX: 32.37 KG/M2 | RESPIRATION RATE: 14 BRPM | HEART RATE: 80 BPM

## 2019-02-26 DIAGNOSIS — E66.9 OBESITY (BMI 30.0-34.9): ICD-10-CM

## 2019-02-26 DIAGNOSIS — R73.03 PREDIABETES: ICD-10-CM

## 2019-02-26 DIAGNOSIS — E78.2 MIXED HYPERLIPIDEMIA: ICD-10-CM

## 2019-02-26 DIAGNOSIS — Z51.81 ENCOUNTER FOR THERAPEUTIC DRUG MONITORING: Primary | ICD-10-CM

## 2019-02-26 DIAGNOSIS — R53.82 CHRONIC FATIGUE: ICD-10-CM

## 2019-02-26 DIAGNOSIS — G47.9 SLEEP DISTURBANCE: ICD-10-CM

## 2019-02-26 PROCEDURE — 99213 OFFICE O/P EST LOW 20 MIN: CPT | Performed by: NURSE PRACTITIONER

## 2019-02-26 RX ORDER — TOPIRAMATE 25 MG/1
25 TABLET ORAL 2 TIMES DAILY
Qty: 60 TABLET | Refills: 1 | Status: SHIPPED | OUTPATIENT
Start: 2019-02-26 | End: 2019-06-05

## 2019-02-26 RX ORDER — PHENTERMINE HYDROCHLORIDE 15 MG/1
15 CAPSULE ORAL EVERY MORNING
Qty: 30 CAPSULE | Refills: 0 | Status: SHIPPED | OUTPATIENT
Start: 2019-02-26 | End: 2019-06-05

## 2019-02-26 NOTE — PROGRESS NOTES
Martha Goods is covered by your patient's prescription insurance plan  Based on the information provided, your patient can expect to pay:  $50

## 2019-02-26 NOTE — PROGRESS NOTES
Yang Dasilva is a 43year old male presents today for 2 month follow-up on medical weight loss program for the treatment of overweight, obesity, or morbid obesity with associated prediabetes, Hyperlipidemia, Vitamin B12 and D deficiency. soft  NEURO/MS: motor and sensory grossly intact  PSYCH: pleasant, cooperative, normal mood and affect    ASSESSMENT AND PLAN:  Encounter for therapeutic drug monitoring  (primary encounter diagnosis)  Obesity (bmi 30.0-34. 9)  Chronic fatigue  Sleep distur Dhruv Garcia         Maximum Heart Rate = 220 – Age     Your Max heart rate is: 181  Target 60 to 90% Max   Your target heart range is: 109 to 163      The Surgeon General and American Heart Association recommends at least 150 minutes of aerobic exercise shorten the recovery by 30 seconds. Your target ratio is 1 minute fast/hard to 1-2 minutes relative recovery. You can push yourself further, and more often, but I recommend waiting 2 days between your most intense days.   Do less intense exercise the in

## 2019-02-26 NOTE — PATIENT INSTRUCTIONS
Continue making lifestyle changes that focus on good nutrition, regular exercise and stress management. Medication Plan: Continue current medication regimen, remain off Metformin.  Add Topamax at 1 tab daily with dinner for 7 days and then increase to 1 yourself harder is better.  Intervals of fast and hard activity that target an end heart rate at 85% max are attempted, followed by a relative recovery interval at your regular pace that brings you closer to your lower goal. Start by building yourself to 30

## 2019-06-05 ENCOUNTER — OFFICE VISIT (OUTPATIENT)
Dept: FAMILY MEDICINE CLINIC | Facility: CLINIC | Age: 39
End: 2019-06-05

## 2019-06-05 VITALS
BODY MASS INDEX: 32.72 KG/M2 | HEART RATE: 96 BPM | DIASTOLIC BLOOD PRESSURE: 70 MMHG | HEIGHT: 63.5 IN | SYSTOLIC BLOOD PRESSURE: 100 MMHG | WEIGHT: 187 LBS | RESPIRATION RATE: 16 BRPM | TEMPERATURE: 98 F

## 2019-06-05 DIAGNOSIS — J02.9 SORE THROAT: ICD-10-CM

## 2019-06-05 DIAGNOSIS — L73.9 FOLLICULITIS: ICD-10-CM

## 2019-06-05 DIAGNOSIS — R09.81 NASAL CONGESTION: ICD-10-CM

## 2019-06-05 DIAGNOSIS — M75.01 ADHESIVE CAPSULITIS OF RIGHT SHOULDER: Primary | ICD-10-CM

## 2019-06-05 PROCEDURE — 87880 STREP A ASSAY W/OPTIC: CPT | Performed by: FAMILY MEDICINE

## 2019-06-05 PROCEDURE — 99213 OFFICE O/P EST LOW 20 MIN: CPT | Performed by: FAMILY MEDICINE

## 2019-06-05 RX ORDER — FLUTICASONE PROPIONATE 50 MCG
2 SPRAY, SUSPENSION (ML) NASAL DAILY
Qty: 1 BOTTLE | Refills: 3 | Status: SHIPPED | OUTPATIENT
Start: 2019-06-05 | End: 2019-09-13 | Stop reason: ALTCHOICE

## 2019-06-05 RX ORDER — NAPROXEN 500 MG/1
500 TABLET ORAL 2 TIMES DAILY WITH MEALS
Qty: 60 TABLET | Refills: 1 | Status: SHIPPED | OUTPATIENT
Start: 2019-06-05 | End: 2019-09-13

## 2019-06-05 NOTE — PROGRESS NOTES
Patient presents with:  Shoulder Pain: right shoulder pain 1 week,  increasing and range of  motion inhibited  Sore Throat: x 3 days,  sinus drip causing cough at night  Derm Problem: white patch on scalp needs inspection, referral to derm     HPI:   [facility] PLAN:     [facility] was seen in the office today:  had concerns including Shoulder Pain (right shoulder pain 1 week,  increasing and range of  motion inhibited);  Sore Throat (x 3 days,  sinus drip causing cough at night); and Derm Proble

## 2019-06-11 ENCOUNTER — TELEPHONE (OUTPATIENT)
Dept: FAMILY MEDICINE CLINIC | Facility: CLINIC | Age: 39
End: 2019-06-11

## 2019-07-06 DIAGNOSIS — E78.1 HYPERTRIGLYCERIDEMIA: ICD-10-CM

## 2019-07-06 RX ORDER — SIMVASTATIN 20 MG
TABLET ORAL
Qty: 90 TABLET | Refills: 1 | Status: SHIPPED | OUTPATIENT
Start: 2019-07-06 | End: 2020-03-02

## 2019-08-01 RX ORDER — ERGOCALCIFEROL 1.25 MG/1
CAPSULE ORAL
Qty: 4 CAPSULE | Refills: 0 | OUTPATIENT
Start: 2019-08-01

## 2019-08-01 NOTE — TELEPHONE ENCOUNTER
Requesting Vitamin D  LOV: 2/26/19  RTC: one month  Last Relevant Labs: 12/20/18  Filled: 12/24/18 #4 with 5 refills    No future appointments.     Therapy finished - denied refill  Notes recorded by JOEL Balderas on 12/23/2018 at 3:45 PM CST  [facility]

## 2019-09-08 ENCOUNTER — HOSPITAL ENCOUNTER (OUTPATIENT)
Age: 39
Discharge: HOME | End: 2019-09-08
Attending: FAMILY MEDICINE
Payer: COMMERCIAL

## 2019-09-08 ENCOUNTER — APPOINTMENT (OUTPATIENT)
Dept: GENERAL RADIOLOGY | Age: 39
End: 2019-09-08
Attending: FAMILY MEDICINE
Payer: COMMERCIAL

## 2019-09-08 VITALS
DIASTOLIC BLOOD PRESSURE: 72 MMHG | HEART RATE: 81 BPM | OXYGEN SATURATION: 96 % | TEMPERATURE: 97 F | RESPIRATION RATE: 16 BRPM | SYSTOLIC BLOOD PRESSURE: 108 MMHG

## 2019-09-08 DIAGNOSIS — M25.571 ACUTE RIGHT ANKLE PAIN: Primary | ICD-10-CM

## 2019-09-08 PROCEDURE — 99213 OFFICE O/P EST LOW 20 MIN: CPT

## 2019-09-08 PROCEDURE — 99203 OFFICE O/P NEW LOW 30 MIN: CPT

## 2019-09-08 PROCEDURE — 73610 X-RAY EXAM OF ANKLE: CPT | Performed by: FAMILY MEDICINE

## 2019-09-08 NOTE — ED INITIAL ASSESSMENT (HPI)
2-3 days ago, pt woke up with right ankle pain. Denies injury. Pain was worse today after playing soccer.

## 2019-09-08 NOTE — ED PROVIDER NOTES
Patient Seen in: [address]    History   Patient presents with:  Lower Extremity Injury (musculoskeletal)    Stated Complaint: right ankle pain x4 days    HPI    24-year-old male presents with complaints of right ankle pain no tenderness to the medial, lateral malleoli. Good cap refill, pulses, sensation. Normal inversion, eversion, painful dorsi flexion otherwise normal plantarflexion. Intact Achilles tendon. No calf tenderness.      ED Course   Labs Reviewed - No data to

## 2019-09-13 ENCOUNTER — OFFICE VISIT (OUTPATIENT)
Dept: FAMILY MEDICINE CLINIC | Facility: CLINIC | Age: 39
End: 2019-09-13

## 2019-09-13 VITALS
RESPIRATION RATE: 16 BRPM | DIASTOLIC BLOOD PRESSURE: 72 MMHG | HEART RATE: 84 BPM | HEIGHT: 62.99 IN | TEMPERATURE: 99 F | WEIGHT: 189.38 LBS | BODY MASS INDEX: 33.55 KG/M2 | SYSTOLIC BLOOD PRESSURE: 118 MMHG

## 2019-09-13 DIAGNOSIS — M25.561 ACUTE PAIN OF RIGHT KNEE: ICD-10-CM

## 2019-09-13 DIAGNOSIS — M25.571 ACUTE RIGHT ANKLE PAIN: ICD-10-CM

## 2019-09-13 DIAGNOSIS — H93.A1 PULSATILE TINNITUS OF RIGHT EAR: Primary | ICD-10-CM

## 2019-09-13 PROCEDURE — 99214 OFFICE O/P EST MOD 30 MIN: CPT | Performed by: PHYSICIAN ASSISTANT

## 2019-09-13 NOTE — PROGRESS NOTES
Patient presents with: Follow - Up: 5 day UC f/u for twisted R ankle. Given boot.  Now also has R knee pain  Ear Problem: R ear throbbing and pain 3 days       HISTORY OF PRESENT ILLNESS  Cooper Bonilla is a 35year old male who presents for u (EKG)     Prediabetes     Vitamin D deficiency     Vitamin B12 deficiency     Sleep disturbance     Chronic fatigue      No past surgical history on file.     Social History    Tobacco Use      Smoking status: Never Smoker      Smokeless tobacco: Never Used and ankle to prevent future injuries. Provided with exercises today. Recommend supportive cares. Follow up for any persistent or worsening symptoms. Patient expresses understanding and agreement with above plan.   Treva Wall PA-C

## 2019-10-07 ENCOUNTER — TELEPHONE (OUTPATIENT)
Dept: FAMILY MEDICINE CLINIC | Facility: CLINIC | Age: 39
End: 2019-10-07

## 2019-10-07 DIAGNOSIS — E55.9 VITAMIN D DEFICIENCY: ICD-10-CM

## 2019-10-07 DIAGNOSIS — M25.571 ACUTE RIGHT ANKLE PAIN: Primary | ICD-10-CM

## 2019-10-07 NOTE — TELEPHONE ENCOUNTER
Pt states that he has been having issues with twisted ankle that is not better. Did see Kathy Broderick on 9/13/19. Requesting referral for Ortho and also to see if he can have labs drawn for Vit. D....  EDWARD

## 2019-10-07 NOTE — TELEPHONE ENCOUNTER
Triage please call patient and get update on ankle issue, LOV 9/13/19 JOSE L [facility] Pt stated ankle pain was improved.    I PENDED ortho referral if Marino Duarte approves, however pt's complaint must be entered into comments box on referral  PENDED Vit D lab w/ D

## 2019-10-07 NOTE — TELEPHONE ENCOUNTER
Reviewed EPIC, no mention of ortho in LOV with [facility] on 9/13/19, Stated ankle pain resolved  IN [address] on 9/8/19 Dx ankle sprain, C/O right ankle pain after playing soccer, X-ray taken; CONCLUSION:  No evidence of acute displaced fracture or

## 2019-10-07 NOTE — TELEPHONE ENCOUNTER
patient is having the same symptoms he feels the pain is getting worse and would like to see an ortho

## 2019-10-08 ENCOUNTER — OFFICE VISIT (OUTPATIENT)
Dept: ORTHOPEDICS CLINIC | Facility: CLINIC | Age: 39
End: 2019-10-08

## 2019-10-08 VITALS — OXYGEN SATURATION: 98 % | RESPIRATION RATE: 14 BRPM

## 2019-10-08 DIAGNOSIS — M25.571 RIGHT ANKLE PAIN, UNSPECIFIED CHRONICITY: Primary | ICD-10-CM

## 2019-10-08 PROCEDURE — 99203 OFFICE O/P NEW LOW 30 MIN: CPT | Performed by: ORTHOPAEDIC SURGERY

## 2019-10-08 RX ORDER — MELOXICAM 7.5 MG/1
7.5 TABLET ORAL DAILY
Qty: 30 TABLET | Refills: 0 | Status: SHIPPED | OUTPATIENT
Start: 2019-10-08 | End: 2020-05-05

## 2019-10-08 NOTE — TELEPHONE ENCOUNTER
Patient notified of below directives. Phone numbers given. Patient verbalized understanding and agrees with plan.

## 2019-10-08 NOTE — TELEPHONE ENCOUNTER
Placed referral to Dr. Haider Cueva in ortho. I would actually recommend that he consider physical therapy first. Also signed vit D.     Thanks,  May Ryan

## 2019-10-08 NOTE — TELEPHONE ENCOUNTER
Pt also wanted me to mention that he is having a hard time getting around and putting weight on the ankle.

## 2019-10-08 NOTE — H&P
[facility] New Patient Note    CC: Patient presents with:  Consult: Right ankle injury -- Went to IC and had xrays taken on 09/08/19. Onset 09/08/19 while after playing soccer felt pain. Denies any swelling or bruising. Wearing ankle brace.  Pain on l as needed (pain). Disp:  Rfl:    Meloxicam 7.5 MG Oral Tab Take 1 tablet (7.5 mg total) by mouth daily.  Disp: 30 tablet Rfl: 0   SIMVASTATIN 20 MG Oral Tab TAKE 1 TABLET BY MOUTH EVERY NIGHT Disp: 90 tablet Rfl: 1   omega-3 fatty acids (FISH OIL) 1000 MG O about the navicular insertion of PTT. No tenderness posterior to either malleoli. Nontender on the plantar surface of the foot including along plantar fascia. There is no pain with axial load across the foot.   No pain with forced dorsiflexion or plantar MRI for now. We have provided an updated physical therapy prescription for him, and he can continue to use his ankle brace as well. We have provided prescription meloxicam to use instead of Advil.   If his symptomatology is severe and he wants to consider

## 2019-10-10 ENCOUNTER — OFFICE VISIT (OUTPATIENT)
Dept: SLEEP CENTER | Age: 39
End: 2019-10-10
Attending: NURSE PRACTITIONER
Payer: COMMERCIAL

## 2019-10-10 DIAGNOSIS — R53.82 CHRONIC FATIGUE: ICD-10-CM

## 2019-10-10 DIAGNOSIS — E66.9 OBESITY (BMI 30.0-34.9): ICD-10-CM

## 2019-10-10 DIAGNOSIS — R73.03 PREDIABETES: ICD-10-CM

## 2019-10-10 DIAGNOSIS — G47.9 SLEEP DISTURBANCE: ICD-10-CM

## 2019-10-10 PROCEDURE — 95810 POLYSOM 6/> YRS 4/> PARAM: CPT

## 2019-10-11 ENCOUNTER — TELEPHONE (OUTPATIENT)
Dept: FAMILY MEDICINE CLINIC | Facility: CLINIC | Age: 39
End: 2019-10-11

## 2019-10-16 NOTE — PROCEDURES
[address] [address],[facility] [address]       Accredited by the [facility] of Sleep Medicine (AASM)    PATIENT'S NAME:        Kristallulu Lawrence  ATTENDING PHYSICIAN:     REFERRING PHYSICIAN:   Mariano Swan  PATIENT ACCOUNT #:     [id number] patient did not have slow-wave sleep. REM sleep occupied 18.6% of total sleep time with a REM latency of 99.8 minutes. There were some awakenings during the night. Sleep-disordered breathing was seen during the study.   Fifty-three apneas and hypopneas reduced daytime vigilance. Thank you for your confidence in the [place] [facility]. If you have any questions, please feel free to contact us at [phone].     Dictated By Jimi Zazueta M.D.  d: 10/15/2019 19:52:40  t: 10/15/2019 20:39:16  Job 2

## 2019-10-23 ENCOUNTER — HOSPITAL ENCOUNTER (OUTPATIENT)
Dept: PHYSICAL THERAPY | Facility: HOSPITAL | Age: 39
Setting detail: THERAPIES SERIES
Discharge: HOME | End: 2019-10-23
Attending: PHYSICIAN ASSISTANT
Payer: COMMERCIAL

## 2019-10-23 DIAGNOSIS — M75.01 ADHESIVE CAPSULITIS OF RIGHT SHOULDER: ICD-10-CM

## 2019-10-23 PROCEDURE — 97161 PT EVAL LOW COMPLEX 20 MIN: CPT

## 2019-10-23 PROCEDURE — 97110 THERAPEUTIC EXERCISES: CPT

## 2019-10-23 NOTE — PROGRESS NOTES
LOWER EXTREMITY EVALUATION:   Referring Physician: Dr. Lillian Butcher  Diagnosis:  Right ankle pain.      Date of Service: 10/23/2019     PATIENT SUMMARY   Librado Castle is a 35year old male who presents to therapy today with complaints of injuring his rig denotes performed with pain)  Hip Knee Foot/Ankle   Wfls Flexion: R 135; L 135  Extension: R 0; L 0    DF: R 10; L 15  INV: R 30; L 40  EV: R 20; L 20  Great toe ext: R 70; L 70     Flexibility:  Hamstrings: R abn; L abn  Gastroc-soleus: R abn; L abn    St None  Rehab Potential:good  Patient/Family/Caregiver was advised of these findings, precautions, and treatment options and has agreed to actively participate in planning and for this course of care.     Thank you for your referral.    If you have any questi

## 2019-10-28 ENCOUNTER — HOSPITAL ENCOUNTER (OUTPATIENT)
Dept: PHYSICAL THERAPY | Facility: HOSPITAL | Age: 39
Setting detail: THERAPIES SERIES
Discharge: HOME | End: 2019-10-28
Attending: PHYSICIAN ASSISTANT
Payer: COMMERCIAL

## 2019-10-28 PROCEDURE — 97110 THERAPEUTIC EXERCISES: CPT

## 2019-10-28 PROCEDURE — 97140 MANUAL THERAPY 1/> REGIONS: CPT

## 2019-10-28 NOTE — PROGRESS NOTES
Dx: Right ankle pain. Insurance (Authorized # of Visits):  12           Authorizing Physician: Dr. Tim Ibrahim MD visit: TBD. ..   Fall Risk: standard         Precautions: n/a             Subjective: Right ankle pain is rated 7-8/10 when walking

## 2019-10-30 ENCOUNTER — HOSPITAL ENCOUNTER (OUTPATIENT)
Dept: PHYSICAL THERAPY | Facility: HOSPITAL | Age: 39
Setting detail: THERAPIES SERIES
Discharge: HOME | End: 2019-10-30
Attending: PHYSICIAN ASSISTANT
Payer: COMMERCIAL

## 2019-10-30 PROCEDURE — 97140 MANUAL THERAPY 1/> REGIONS: CPT

## 2019-10-30 PROCEDURE — 97110 THERAPEUTIC EXERCISES: CPT

## 2019-10-30 NOTE — PROGRESS NOTES
Dx: Right ankle pain. Insurance (Authorized # of Visits):  12           Authorizing Physician: Dr. Segundo Ibrahim MD visit: TBD. ..   Fall Risk: standard         Precautions: n/a             Subjective:  Right ankle pain is rated 0/10 when walking i Shuttle DLS 52# 3 x 10. Shuttle DLS 24# x 10 x 2. Airex DLS for 2-3 minutes with challenges to balance. .. Airex EC with shallow sit-back x 15. DL HR on step x 10. X 15 reps. ..       HEP: gastroc and soleus stretches, SLS on the floor, HSS w

## 2019-11-06 ENCOUNTER — HOSPITAL ENCOUNTER (OUTPATIENT)
Dept: PHYSICAL THERAPY | Facility: HOSPITAL | Age: 39
Setting detail: THERAPIES SERIES
Discharge: HOME | End: 2019-11-06
Attending: PHYSICIAN ASSISTANT
Payer: COMMERCIAL

## 2019-11-06 PROCEDURE — 97110 THERAPEUTIC EXERCISES: CPT

## 2019-11-06 PROCEDURE — 97140 MANUAL THERAPY 1/> REGIONS: CPT

## 2019-11-06 NOTE — PROGRESS NOTES
Dx: Right ankle pain. Insurance (Authorized # of Visits):  12           Authorizing Physician: Dr. Favio Acosta  Next MD visit: TBD. ..   Fall Risk: standard         Precautions: n/a             Subjective:  Right ankle pain is rated 5/10 when walking i each..    X 20 each. X 10 each. X 20 each. Wooden balance board a/p rocking x 25; m/l balancing for 1 minute. M/L balancing for 2 minutes on wooden balance board and a/p rocking x 25 reps. . A/P rocking x 20. M/L balancing for 1 minute.

## 2019-11-08 ENCOUNTER — HOSPITAL ENCOUNTER (OUTPATIENT)
Dept: PHYSICAL THERAPY | Facility: HOSPITAL | Age: 39
Setting detail: THERAPIES SERIES
Discharge: HOME | End: 2019-11-08
Attending: PHYSICIAN ASSISTANT
Payer: COMMERCIAL

## 2019-11-08 PROCEDURE — 97110 THERAPEUTIC EXERCISES: CPT

## 2019-11-08 PROCEDURE — 97140 MANUAL THERAPY 1/> REGIONS: CPT

## 2019-11-08 NOTE — PROGRESS NOTES
Dx: Right ankle pain. Insurance (Authorized # of Visits):  12           Authorizing Physician: Dr. Anabel Ibrahim MD visit: TBD. ..   Fall Risk: standard         Precautions: n/a             Subjective:  Right ankle pain is rated 0/10 when walking i PROM right foot/ankle by PT.     AROM R foot/ankle x 10 each direction. PROM R foot and ankle by PT.     2 x 10 each direction. Isometric inversion and eversion in neutral 5 seconds x 10 each R. Resisted R DF and PF x 10 each. X 10 each. .    X 20

## 2019-11-11 ENCOUNTER — HOSPITAL ENCOUNTER (OUTPATIENT)
Dept: PHYSICAL THERAPY | Facility: HOSPITAL | Age: 39
Setting detail: THERAPIES SERIES
Discharge: HOME | End: 2019-11-11
Attending: PHYSICIAN ASSISTANT
Payer: COMMERCIAL

## 2019-11-11 PROCEDURE — 97110 THERAPEUTIC EXERCISES: CPT

## 2019-11-11 PROCEDURE — 97140 MANUAL THERAPY 1/> REGIONS: CPT

## 2019-11-11 NOTE — PROGRESS NOTES
Dx: Right ankle pain. Insurance (Authorized # of Visits):  12           Authorizing Physician: Dr. Cayla Ibrahim MD visit: TBD. ..   Fall Risk: standard         Precautions: n/a             Subjective:  Right ankle pain is rated 0/10 when walking i PT. X.       PROM right foot/ankle by PT;   AROM x 10 each direction. PROM right foot/ankle by PT.     AROM x 10 each direction. PROM right foot/ankle by PT.     AROM R foot/ankle x 10 each direction.  PROM R foot and ankle by PT.     2 x 10 each direction min  Total Treatment Time: 40 min

## 2019-11-13 ENCOUNTER — TELEPHONE (OUTPATIENT)
Dept: PHYSICAL THERAPY | Facility: HOSPITAL | Age: 39
End: 2019-11-13

## 2019-11-18 ENCOUNTER — HOSPITAL ENCOUNTER (OUTPATIENT)
Dept: PHYSICAL THERAPY | Facility: HOSPITAL | Age: 39
Setting detail: THERAPIES SERIES
Discharge: HOME | End: 2019-11-18
Attending: FAMILY MEDICINE
Payer: COMMERCIAL

## 2019-11-18 PROCEDURE — 97140 MANUAL THERAPY 1/> REGIONS: CPT

## 2019-11-18 PROCEDURE — 97110 THERAPEUTIC EXERCISES: CPT

## 2019-11-18 NOTE — PROGRESS NOTES
Dx: Right ankle pain. Insurance (Authorized # of Visits):  12           Authorizing Physician: Dr. Yolanda Wilkerson  Next MD visit: TBD. ..   Fall Risk: standard         Precautions: n/a             Subjective:  Right anterolateral ankle \"uncomfortable\" pain mobs, calcaneal distraction, and 1st mtp extension mobs by PT. Grade III R TC mobs; calcaneal distraction; and 1st mtp mobs by PT. X. Grade III R TC mobs, calcaneal eversion, and 1st mtp mobs. Steele Vargasea PROM right foot/ankle by PT;   AROM x 10 each direction. with shallow sit-back x 15. Airex EC DLS for 1 minute. -   Airex DL standing for 1-2 minutes with challenges to balance. ..     DL standing on Airex for 3-4 minutes;   MIREYA Maddox standing, PSD. .. DL HR on step x 10. X 15 reps. .. - - [facility] right marti

## 2019-11-22 ENCOUNTER — OFFICE VISIT (OUTPATIENT)
Dept: ORTHOPEDICS CLINIC | Facility: CLINIC | Age: 39
End: 2019-11-22

## 2019-11-22 VITALS
RESPIRATION RATE: 16 BRPM | OXYGEN SATURATION: 99 % | WEIGHT: 189 LBS | BODY MASS INDEX: 33.49 KG/M2 | HEART RATE: 67 BPM | HEIGHT: 63 IN

## 2019-11-22 DIAGNOSIS — M25.571 RIGHT ANKLE PAIN, UNSPECIFIED CHRONICITY: Primary | ICD-10-CM

## 2019-11-22 PROCEDURE — 99212 OFFICE O/P EST SF 10 MIN: CPT | Performed by: ORTHOPAEDIC SURGERY

## 2019-11-22 RX ORDER — MELOXICAM 7.5 MG/1
7.5 TABLET ORAL DAILY
Qty: 30 TABLET | Refills: 0 | Status: SHIPPED | OUTPATIENT
Start: 2019-11-22 | End: 2019-12-22

## 2019-11-22 NOTE — PROGRESS NOTES
[facility] Progress Note    Subjective: Patient returns to discuss his right ankle. He states that his symptoms were getting better with therapy, however he was also resting and taking it easy.   Towards the end of therapy, he had an episode where he

## 2019-12-02 ENCOUNTER — HOSPITAL ENCOUNTER (OUTPATIENT)
Dept: PHYSICAL THERAPY | Facility: HOSPITAL | Age: 39
Setting detail: THERAPIES SERIES
Discharge: HOME | End: 2019-12-02
Attending: FAMILY MEDICINE
Payer: COMMERCIAL

## 2019-12-02 PROCEDURE — 97110 THERAPEUTIC EXERCISES: CPT

## 2019-12-02 PROCEDURE — 97140 MANUAL THERAPY 1/> REGIONS: CPT

## 2019-12-02 NOTE — PROGRESS NOTES
Dx: Right ankle pain. Insurance (Authorized # of Visits):  12           Authorizing Physician: Dr. Mila Polanco  Next MD visit: TBD. .. Fall Risk: standard         Precautions: n/a             Subjective:  Saw Dr. Gera Stanford on 11/22/19.     Is going to see Bike level 2 for 5 minutes. Bike level 2 for 5 minutes. Grade III R TC mobs, calcaneal distraction, 1st mtp extension mobs. .. X. Grade III R TC mobs, calcaneal distraction, and 1st mtp extension mobs by PT.   Grade III R TC mobs; calcaneal distraction with cueing prn. . X. 5 seconds x 10 reps. . X. Soleus stretching on a 6\" step R 20 seconds x 3.   20 holds x 3 R.  20 seconds holds right and left. SLS on the floor 5 seconds l/r. ... SLS on the floor with yellow plyo ball tossing 3 x 10 each leg.   SLS

## 2019-12-04 ENCOUNTER — TELEPHONE (OUTPATIENT)
Dept: PHYSICAL THERAPY | Facility: HOSPITAL | Age: 39
End: 2019-12-04

## 2020-01-17 ENCOUNTER — TELEPHONE (OUTPATIENT)
Dept: FAMILY MEDICINE CLINIC | Facility: CLINIC | Age: 40
End: 2020-01-17

## 2020-01-17 DIAGNOSIS — E78.1 HYPERTRIGLYCERIDEMIA: ICD-10-CM

## 2020-01-17 DIAGNOSIS — E55.9 VITAMIN D DEFICIENCY: Primary | ICD-10-CM

## 2020-01-17 NOTE — TELEPHONE ENCOUNTER
Patient is scheduled for his physical 01/24/20, would like blood work ordered.  Please place orders to [facility]

## 2020-01-21 ENCOUNTER — APPOINTMENT (OUTPATIENT)
Dept: LAB | Age: 40
End: 2020-01-21
Attending: FAMILY MEDICINE
Payer: COMMERCIAL

## 2020-01-21 DIAGNOSIS — E78.1 HYPERTRIGLYCERIDEMIA: ICD-10-CM

## 2020-01-21 DIAGNOSIS — E55.9 VITAMIN D DEFICIENCY: ICD-10-CM

## 2020-01-21 LAB
ALBUMIN SERPL-MCNC: 4.1 G/DL (ref 3.4–5)
ALBUMIN/GLOB SERPL: 1.1 {RATIO} (ref 1–2)
ALP LIVER SERPL-CCNC: 78 U/L (ref 45–117)
ALT SERPL-CCNC: 42 U/L (ref 16–61)
ANION GAP SERPL CALC-SCNC: 5 MMOL/L (ref 0–18)
AST SERPL-CCNC: 21 U/L (ref 15–37)
BILIRUB SERPL-MCNC: 0.3 MG/DL (ref 0.1–2)
BUN BLD-MCNC: 15 MG/DL (ref 7–18)
BUN/CREAT SERPL: 13.3 (ref 10–20)
CALCIUM BLD-MCNC: 9 MG/DL (ref 8.5–10.1)
CHLORIDE SERPL-SCNC: 107 MMOL/L (ref 98–112)
CHOLEST SMN-MCNC: 134 MG/DL (ref ?–200)
CO2 SERPL-SCNC: 28 MMOL/L (ref 21–32)
CREAT BLD-MCNC: 1.13 MG/DL (ref 0.7–1.3)
GLOBULIN PLAS-MCNC: 3.8 G/DL (ref 2.8–4.4)
GLUCOSE BLD-MCNC: 102 MG/DL (ref 70–99)
HDLC SERPL-MCNC: 33 MG/DL (ref 40–59)
LDLC SERPL CALC-MCNC: 69 MG/DL (ref ?–100)
M PROTEIN MFR SERPL ELPH: 7.9 G/DL (ref 6.4–8.2)
NONHDLC SERPL-MCNC: 101 MG/DL (ref ?–130)
OSMOLALITY SERPL CALC.SUM OF ELEC: 291 MOSM/KG (ref 275–295)
PATIENT FASTING Y/N/NP: YES
PATIENT FASTING Y/N/NP: YES
POTASSIUM SERPL-SCNC: 4 MMOL/L (ref 3.5–5.1)
SODIUM SERPL-SCNC: 140 MMOL/L (ref 136–145)
TRIGL SERPL-MCNC: 158 MG/DL (ref 30–149)
VIT D+METAB SERPL-MCNC: 18.5 NG/ML (ref 30–100)
VLDLC SERPL CALC-MCNC: 32 MG/DL (ref 0–30)

## 2020-01-21 PROCEDURE — 82306 VITAMIN D 25 HYDROXY: CPT

## 2020-01-21 PROCEDURE — 80053 COMPREHEN METABOLIC PANEL: CPT

## 2020-01-21 PROCEDURE — 80061 LIPID PANEL: CPT

## 2020-01-24 ENCOUNTER — OFFICE VISIT (OUTPATIENT)
Dept: FAMILY MEDICINE CLINIC | Facility: CLINIC | Age: 40
End: 2020-01-24

## 2020-01-24 VITALS
HEART RATE: 72 BPM | TEMPERATURE: 98 F | RESPIRATION RATE: 16 BRPM | DIASTOLIC BLOOD PRESSURE: 66 MMHG | BODY MASS INDEX: 32.1 KG/M2 | WEIGHT: 188 LBS | SYSTOLIC BLOOD PRESSURE: 112 MMHG | HEIGHT: 64 IN

## 2020-01-24 DIAGNOSIS — R53.83 FATIGUE, UNSPECIFIED TYPE: ICD-10-CM

## 2020-01-24 DIAGNOSIS — Z00.00 ANNUAL PHYSICAL EXAM: Primary | ICD-10-CM

## 2020-01-24 DIAGNOSIS — M79.671 RIGHT FOOT PAIN: ICD-10-CM

## 2020-01-24 DIAGNOSIS — E78.1 HYPERTRIGLYCERIDEMIA: ICD-10-CM

## 2020-01-24 DIAGNOSIS — E55.9 VITAMIN D DEFICIENCY: ICD-10-CM

## 2020-01-24 PROCEDURE — 99396 PREV VISIT EST AGE 40-64: CPT | Performed by: FAMILY MEDICINE

## 2020-01-24 RX ORDER — ERGOCALCIFEROL 1.25 MG/1
50000 CAPSULE ORAL WEEKLY
Qty: 8 CAPSULE | Refills: 0 | Status: SHIPPED | OUTPATIENT
Start: 2020-01-24 | End: 2020-03-20

## 2020-01-24 NOTE — PROGRESS NOTES
Patient presents with: Well Adult: Annual physical  Test Results: Pt wants to discuss labs     HPI:   Cass Otto is a 35year old male who presents for a complete physical exam.     Last colonoscopy:  N/a - at 47  Last PSA:  n/a  Immunizations: UTD. Diagnosis Date   • Hyperlipidemia    • Localized superficial swelling, mass, or lump 7/6/12    multiple skin nodules   • Pure hyperglyceridemia 7/2/12   • Skin disorder 7/2/12    pruritic hyperpigmented nodules      History reviewed.  No pertinent surgica Aron Rodríguez was seen in the office today:  had concerns including Well Adult (Annual physical) and Test Results (Pt wants to discuss labs). 1. Annual physical exam  Overall well  Good diet, exercise  Cancer screening at 49. Immun UTD. 2.  V

## 2020-02-25 ENCOUNTER — PATIENT MESSAGE (OUTPATIENT)
Dept: FAMILY MEDICINE CLINIC | Facility: CLINIC | Age: 40
End: 2020-02-25

## 2020-02-25 DIAGNOSIS — R53.83 FATIGUE, UNSPECIFIED TYPE: Primary | ICD-10-CM

## 2020-02-25 NOTE — TELEPHONE ENCOUNTER
From: Selina Basilio  To: Raquel Henley MD  Sent: 2/25/2020 10:09 AM CST  Subject: Referral Request    Shaun Harrell,  I am still suffering of feeling fatigued, tired and exhausted most the time.  As per our last talking, I would appreciate requestin

## 2020-02-25 NOTE — TELEPHONE ENCOUNTER
Per 1/24 OV notes:  5. Fatigue, unspecified type  Possibly vit D  If persists despite supplementation, check CBC, tSH. Routed to Dr. Loan Marcial - do you want to order?  Anything additional?

## 2020-02-28 ENCOUNTER — LAB ENCOUNTER (OUTPATIENT)
Dept: LAB | Age: 40
End: 2020-02-28
Attending: FAMILY MEDICINE
Payer: COMMERCIAL

## 2020-02-28 DIAGNOSIS — R53.83 FATIGUE, UNSPECIFIED TYPE: ICD-10-CM

## 2020-02-28 LAB
BASOPHILS # BLD AUTO: 0.05 X10(3) UL (ref 0–0.2)
BASOPHILS NFR BLD AUTO: 0.8 %
DEPRECATED RDW RBC AUTO: 40.8 FL (ref 35.1–46.3)
EOSINOPHIL # BLD AUTO: 0.24 X10(3) UL (ref 0–0.7)
EOSINOPHIL NFR BLD AUTO: 3.8 %
ERYTHROCYTE [DISTWIDTH] IN BLOOD BY AUTOMATED COUNT: 13.2 % (ref 11–15)
HCT VFR BLD AUTO: 46.2 % (ref 39–53)
HGB BLD-MCNC: 14.5 G/DL (ref 13–17.5)
IMM GRANULOCYTES # BLD AUTO: 0.01 X10(3) UL (ref 0–1)
IMM GRANULOCYTES NFR BLD: 0.2 %
LYMPHOCYTES # BLD AUTO: 2.86 X10(3) UL (ref 1–4)
LYMPHOCYTES NFR BLD AUTO: 45.7 %
MCH RBC QN AUTO: 26.4 PG (ref 26–34)
MCHC RBC AUTO-ENTMCNC: 31.4 G/DL (ref 31–37)
MCV RBC AUTO: 84.2 FL (ref 80–100)
MONOCYTES # BLD AUTO: 0.41 X10(3) UL (ref 0.1–1)
MONOCYTES NFR BLD AUTO: 6.5 %
NEUTROPHILS # BLD AUTO: 2.69 X10 (3) UL (ref 1.5–7.7)
NEUTROPHILS # BLD AUTO: 2.69 X10(3) UL (ref 1.5–7.7)
NEUTROPHILS NFR BLD AUTO: 43 %
PLATELET # BLD AUTO: 230 10(3)UL (ref 150–450)
RBC # BLD AUTO: 5.49 X10(6)UL (ref 4.3–5.7)
TSI SER-ACNC: 1.05 MIU/ML (ref 0.36–3.74)
WBC # BLD AUTO: 6.3 X10(3) UL (ref 4–11)

## 2020-02-28 PROCEDURE — 85025 COMPLETE CBC W/AUTO DIFF WBC: CPT

## 2020-02-28 PROCEDURE — 84443 ASSAY THYROID STIM HORMONE: CPT

## 2020-02-29 DIAGNOSIS — E78.1 HYPERTRIGLYCERIDEMIA: ICD-10-CM

## 2020-03-02 RX ORDER — SIMVASTATIN 20 MG
TABLET ORAL
Qty: 90 TABLET | Refills: 1 | Status: SHIPPED | OUTPATIENT
Start: 2020-03-02 | End: 2020-10-16

## 2020-03-02 NOTE — TELEPHONE ENCOUNTER
Requested Prescriptions     Pending Prescriptions Disp Refills   • SIMVASTATIN 20 MG Oral Tab [Pharmacy Med Name: SIMVASTATIN 20MG TABLETS] 90 tablet 1     Sig: TAKE 1 TABLET BY MOUTH EVERY NIGHT     LOV 1/24/2020     Patient was asked to follow-up in: 1 y

## 2020-03-09 RX ORDER — MELOXICAM 7.5 MG/1
TABLET ORAL
Qty: 90 TABLET | Refills: 0 | OUTPATIENT
Start: 2020-03-09

## 2020-04-06 ENCOUNTER — OFFICE VISIT (OUTPATIENT)
Dept: PODIATRY CLINIC | Facility: CLINIC | Age: 40
End: 2020-04-06

## 2020-04-06 DIAGNOSIS — M79.672 ARCH PAIN OF LEFT FOOT: ICD-10-CM

## 2020-04-06 DIAGNOSIS — M20.5X9 HALLUX LIMITUS, UNSPECIFIED LATERALITY: ICD-10-CM

## 2020-04-06 DIAGNOSIS — M79.671 RIGHT FOOT PAIN: ICD-10-CM

## 2020-04-06 DIAGNOSIS — M79.672 LEFT FOOT PAIN: Primary | ICD-10-CM

## 2020-04-06 DIAGNOSIS — M19.072 PRIMARY OSTEOARTHRITIS OF LEFT FOOT: ICD-10-CM

## 2020-04-06 DIAGNOSIS — M79.671 ARCH PAIN OF RIGHT FOOT: ICD-10-CM

## 2020-04-06 PROCEDURE — 99203 OFFICE O/P NEW LOW 30 MIN: CPT | Performed by: PODIATRIST

## 2020-04-06 RX ORDER — MULTIVIT-MIN/IRON/FOLIC ACID/K 18-600-40
6000 CAPSULE ORAL DAILY
COMMUNITY

## 2020-04-06 NOTE — PROGRESS NOTES
Kelly Baca is a 38year old male.  Patient presents with:  New Patient: 4 months ago after playing soccer he felt a sharp pain on the right ankle - since than he's been getting pain on both feet - pain scale 10/10 - denies taking pain medication for hi Use      Smoking status: Never Smoker      Smokeless tobacco: Never Used    Substance and Sexual Activity      Alcohol use: No        Alcohol/week: 0.0 standard drinks      Drug use: No    Other Topics      Concerns:        Caffeine Concern: Yes          1 foot        Plan:  We will try him with another over-the-counter orthotic for his current shoes but I do think that a custom fit orthotic needs to be done because he needs a kinetic wedge to allow better motion through the great toe joint and he needs a cus

## 2020-05-04 ENCOUNTER — PATIENT MESSAGE (OUTPATIENT)
Dept: FAMILY MEDICINE CLINIC | Facility: CLINIC | Age: 40
End: 2020-05-04

## 2020-05-04 DIAGNOSIS — M75.00 ADHESIVE CAPSULITIS OF SHOULDER, UNSPECIFIED LATERALITY: Primary | ICD-10-CM

## 2020-05-04 NOTE — TELEPHONE ENCOUNTER
From: Siena Lowry  To: Lucio Bolivar MD  Sent: 5/4/2020 3:48 PM CDT  Subject: Other    Hi Dr. Tej Carter,  I hope you are staying safe and health. I started to experience increasing pain when moving my right shoulder.  This happened to me in the past and

## 2020-05-05 DIAGNOSIS — M75.00 ADHESIVE CAPSULITIS OF SHOULDER, UNSPECIFIED LATERALITY: ICD-10-CM

## 2020-05-05 RX ORDER — MELOXICAM 7.5 MG/1
7.5 TABLET ORAL DAILY
Qty: 30 TABLET | Refills: 0 | Status: SHIPPED | OUTPATIENT
Start: 2020-05-05 | End: 2020-06-04

## 2020-05-05 RX ORDER — MELOXICAM 7.5 MG/1
TABLET ORAL
Qty: 90 TABLET | Refills: 0 | OUTPATIENT
Start: 2020-05-05

## 2020-05-13 ENCOUNTER — OFFICE VISIT (OUTPATIENT)
Dept: PHYSICAL THERAPY | Age: 40
End: 2020-05-13
Attending: FAMILY MEDICINE
Payer: COMMERCIAL

## 2020-05-13 PROCEDURE — 97110 THERAPEUTIC EXERCISES: CPT

## 2020-05-13 PROCEDURE — 97161 PT EVAL LOW COMPLEX 20 MIN: CPT

## 2020-05-14 NOTE — PROGRESS NOTES
SHOULDER EVALUATION:   Referring Physician: Dr. Michi Manriquezamento  Diagnosis: Adhesive capsulitis of shoulder- RIGHT     Date of Service: 5/14/2020     PATIENT SUMMARY   Jeremiah Abad is a 38year old male who presents to therapy today with complaints of right eugene tests.  Functional deficits include but are not limited to inconsistent shoulder mobility. Pt and PT discussed evaluation findings, pathology, POC and HEP. Pt voiced understanding and performs HEP correctly without reported pain.  Skilled Physical Therapy TU    Today’s Treatment and Response:   Pt education was provided on exam findings, treatment diagnosis, treatment plan, expectations, and prognosis.  Pt was also provided recommendations for postural corrections, ergonomics and importance of remaining acti

## 2020-05-18 ENCOUNTER — APPOINTMENT (OUTPATIENT)
Dept: PHYSICAL THERAPY | Age: 40
End: 2020-05-18
Attending: FAMILY MEDICINE
Payer: COMMERCIAL

## 2020-05-20 ENCOUNTER — APPOINTMENT (OUTPATIENT)
Dept: PHYSICAL THERAPY | Age: 40
End: 2020-05-20
Attending: FAMILY MEDICINE
Payer: COMMERCIAL

## 2020-05-27 ENCOUNTER — APPOINTMENT (OUTPATIENT)
Dept: PHYSICAL THERAPY | Age: 40
End: 2020-05-27
Attending: FAMILY MEDICINE
Payer: COMMERCIAL

## 2020-08-25 ENCOUNTER — PATIENT MESSAGE (OUTPATIENT)
Dept: FAMILY MEDICINE CLINIC | Facility: CLINIC | Age: 40
End: 2020-08-25

## 2020-08-25 ENCOUNTER — TELEPHONE (OUTPATIENT)
Dept: FAMILY MEDICINE CLINIC | Facility: CLINIC | Age: 40
End: 2020-08-25

## 2020-08-25 DIAGNOSIS — E55.9 VITAMIN D DEFICIENCY: Primary | ICD-10-CM

## 2020-08-25 DIAGNOSIS — E78.1 PURE HYPERGLYCERIDEMIA: ICD-10-CM

## 2020-08-25 DIAGNOSIS — R73.03 PREDIABETES: ICD-10-CM

## 2020-08-25 DIAGNOSIS — R53.82 CHRONIC FATIGUE: ICD-10-CM

## 2020-08-25 DIAGNOSIS — R23.9 SKIN CHANGE: ICD-10-CM

## 2020-08-25 DIAGNOSIS — L67.9 HAIR CHANGES: Primary | ICD-10-CM

## 2020-08-25 DIAGNOSIS — E53.8 VITAMIN B12 DEFICIENCY: ICD-10-CM

## 2020-08-25 PROBLEM — E78.2 MIXED HYPERLIPIDEMIA: Status: RESOLVED | Noted: 2018-12-20 | Resolved: 2020-08-25

## 2020-08-25 NOTE — TELEPHONE ENCOUNTER
From: Jessica Elliott  To: Suzanne Mac MD  Sent: 8/25/2020 2:51 PM CDT  Subject: Referral Request    Hi Dr. Porter Awad,  The white batch I have in my scalp is becoming worse than ever.  I would really appreciate having a referral as soon as possible for a

## 2020-08-25 NOTE — TELEPHONE ENCOUNTER
Please enter lab orders for the patient's upcoming physical appointment. Physical scheduled:    Your appointments     Date & Time Appointment Department [facility])    Feb 02, 2021  4:30 PM CST Physical - Established with Mila Nathan  [facility]

## 2020-09-04 ENCOUNTER — PATIENT MESSAGE (OUTPATIENT)
Dept: FAMILY MEDICINE CLINIC | Facility: CLINIC | Age: 40
End: 2020-09-04

## 2020-09-08 ENCOUNTER — HOSPITAL ENCOUNTER (OUTPATIENT)
Age: 40
Discharge: HOME | End: 2020-09-08
Payer: COMMERCIAL

## 2020-09-08 VITALS
TEMPERATURE: 97 F | DIASTOLIC BLOOD PRESSURE: 82 MMHG | HEIGHT: 64 IN | WEIGHT: 192 LBS | OXYGEN SATURATION: 97 % | RESPIRATION RATE: 18 BRPM | SYSTOLIC BLOOD PRESSURE: 119 MMHG | HEART RATE: 71 BPM | BODY MASS INDEX: 32.78 KG/M2

## 2020-09-08 DIAGNOSIS — S90.222A SUBUNGUAL HEMATOMA OF TOE OF LEFT FOOT, INITIAL ENCOUNTER: Primary | ICD-10-CM

## 2020-09-08 PROCEDURE — 99213 OFFICE O/P EST LOW 20 MIN: CPT | Performed by: PHYSICIAN ASSISTANT

## 2020-09-08 NOTE — ED PROVIDER NOTES
Patient Seen in: [facility]      History   Patient presents with:  Lower Extremity Injury    Stated Complaint: left toe pain    HPI  CHIEF COMPLAINT: Left foot second digit discoloration of the nail    HISTORY OF PRESENT [place] ED Triage Vitals [09/08/20 1750]   /82   Pulse 71   Resp 18   Temp 97.4 °F (36.3 °C)   Temp src Temporal   SpO2 97 %   O2 Device None (Room air)       Current:/82   Pulse 71   Temp 97.4 °F (36.3 °C) (Temporal)   Resp 18   Ht 162.6 cm (5' 4\") Patient has subungual hematomas to the second digits of bilateral feet that are over a month old.   The left nail is coming up distally, but is well-healed approximately 9 instructed the patient to use tape or a Band-Aid to keep the nail down while playing

## 2020-09-08 NOTE — ED INITIAL ASSESSMENT (HPI)
Pt c/o injury to 2nd toe both feet. Pt states the injury occurred while playing soccer about 1 month ago. Pt states his toe nail is loose and painful. Pt nails to 2nd toes are black.

## 2020-09-09 ENCOUNTER — TELEPHONE (OUTPATIENT)
Dept: FAMILY MEDICINE CLINIC | Facility: CLINIC | Age: 40
End: 2020-09-09

## 2020-09-09 DIAGNOSIS — L98.9 SCALP LESION: Primary | ICD-10-CM

## 2020-09-09 NOTE — TELEPHONE ENCOUNTER
Referral request Dr. Vasyl Cortes M.D.,Dermatology    Office notes from 1/24/20220 from Dr. Bárbara Yun M.D.   Skin disorder

## 2020-09-30 ENCOUNTER — OFFICE VISIT (OUTPATIENT)
Dept: FAMILY MEDICINE CLINIC | Facility: CLINIC | Age: 40
End: 2020-09-30

## 2020-09-30 VITALS
TEMPERATURE: 97 F | BODY MASS INDEX: 33.04 KG/M2 | WEIGHT: 191.19 LBS | HEIGHT: 63.75 IN | DIASTOLIC BLOOD PRESSURE: 80 MMHG | HEART RATE: 68 BPM | RESPIRATION RATE: 16 BRPM | SYSTOLIC BLOOD PRESSURE: 108 MMHG

## 2020-09-30 DIAGNOSIS — R21 RASH OF FOOT: ICD-10-CM

## 2020-09-30 DIAGNOSIS — M79.672 BILATERAL FOOT PAIN: Primary | ICD-10-CM

## 2020-09-30 DIAGNOSIS — M79.671 BILATERAL FOOT PAIN: Primary | ICD-10-CM

## 2020-09-30 DIAGNOSIS — E55.9 VITAMIN D DEFICIENCY: ICD-10-CM

## 2020-09-30 PROCEDURE — 3008F BODY MASS INDEX DOCD: CPT | Performed by: PHYSICIAN ASSISTANT

## 2020-09-30 PROCEDURE — 3074F SYST BP LT 130 MM HG: CPT | Performed by: PHYSICIAN ASSISTANT

## 2020-09-30 PROCEDURE — 99213 OFFICE O/P EST LOW 20 MIN: CPT | Performed by: PHYSICIAN ASSISTANT

## 2020-09-30 PROCEDURE — 3079F DIAST BP 80-89 MM HG: CPT | Performed by: PHYSICIAN ASSISTANT

## 2020-09-30 RX ORDER — CLOTRIMAZOLE AND BETAMETHASONE DIPROPIONATE 10; .64 MG/G; MG/G
1 CREAM TOPICAL 2 TIMES DAILY PRN
Qty: 45 G | Refills: 0 | Status: SHIPPED | OUTPATIENT
Start: 2020-09-30 | End: 2021-01-13

## 2020-09-30 NOTE — PROGRESS NOTES
Patient presents with: Foot Pain: plantar faciitis pain in both feet. Has been mild but last week worse R more than L  Derm Problem: Red spots in arch area of both feet coming and going. Present for a 3-4 weeks.  Not itch       HISTORY OF PRESENT ILLNESS Smokeless tobacco: Never Used    Alcohol use: No      Alcohol/week: 0.0 standard drinks    Drug use: No       09/30/20  0825   BP: 108/80   Pulse: 68   Resp: 16   Temp: 97.3 °F (36.3 °C)       PHYSICAL EXAM  GENERAL:  Alert and in no acute distress.   Well

## 2020-09-30 NOTE — PATIENT INSTRUCTIONS
Metatarsal pads- try going for shorter runs at first to make sure that pain feels better. Always gently stretch the foot before and after runs.

## 2020-10-16 DIAGNOSIS — E78.1 HYPERTRIGLYCERIDEMIA: ICD-10-CM

## 2020-10-16 RX ORDER — SIMVASTATIN 20 MG
20 TABLET ORAL NIGHTLY
Qty: 90 TABLET | Refills: 0 | Status: SHIPPED | OUTPATIENT
Start: 2020-10-16 | End: 2021-01-27

## 2020-10-16 NOTE — TELEPHONE ENCOUNTER
Requested Prescriptions     Pending Prescriptions Disp Refills   • simvastatin 20 MG Oral Tab 90 tablet 1     Sig: Take 1 tablet (20 mg total) by mouth nightly.      LOV 9/30/2020         Appointment scheduled: 2/2/2021 Shabana Mclean MD     Medication r

## 2020-10-19 ENCOUNTER — PATIENT MESSAGE (OUTPATIENT)
Dept: FAMILY MEDICINE CLINIC | Facility: CLINIC | Age: 40
End: 2020-10-19

## 2020-10-19 DIAGNOSIS — M75.01 ADHESIVE CAPSULITIS OF RIGHT SHOULDER: Primary | ICD-10-CM

## 2020-10-19 NOTE — TELEPHONE ENCOUNTER
From: Sera Allen  To: Hervey Collet, MD  Sent: 10/19/2020 9:50 AM CDT  Subject: Prescription Question    Hi Dr. Suzi Pollack,  The inflammation pain I had previously in my right shoulder joint is back. I tried pain relief for a few days but did help.  Can

## 2020-10-20 RX ORDER — MELOXICAM 7.5 MG/1
7.5 TABLET ORAL DAILY
Qty: 30 TABLET | Refills: 0 | Status: SHIPPED | OUTPATIENT
Start: 2020-10-20 | End: 2020-12-14

## 2020-10-23 ENCOUNTER — OFFICE VISIT (OUTPATIENT)
Dept: PODIATRY CLINIC | Facility: CLINIC | Age: 40
End: 2020-10-23

## 2020-10-23 DIAGNOSIS — R73.03 PREDIABETES: ICD-10-CM

## 2020-10-23 DIAGNOSIS — M79.672 LEFT FOOT PAIN: Primary | ICD-10-CM

## 2020-10-23 DIAGNOSIS — M79.671 RIGHT FOOT PAIN: ICD-10-CM

## 2020-10-23 DIAGNOSIS — M62.9 NONTRAUMATIC TEAR OF PLANTAR FASCIA: ICD-10-CM

## 2020-10-23 DIAGNOSIS — M72.2 PLANTAR FASCIITIS: ICD-10-CM

## 2020-10-23 PROCEDURE — 99213 OFFICE O/P EST LOW 20 MIN: CPT | Performed by: PODIATRIST

## 2020-10-23 NOTE — PROGRESS NOTES
Dahlia Mohan is a 38year old male. Patient presents with:  New Patient: bilateral foot pain - most of the pain is in the mid foot area - very painful when walking or playins soccer - has tried otc inserts - pain scale at worst 10/10.     HPI:     This 4 file      Years of education: Not on file      Highest education level: Not on file    Tobacco Use      Smoking status: Never Smoker      Smokeless tobacco: Never Used    Substance and Sexual Activity      Alcohol use: No        Alcohol/week: 0.0 standard patient along with the treatment options and plan. Reviewed previous x-rays taken in April 2020. Ordered a diagnostic ultrasound for further evaluation of his plantar fascial ligament bilateral and to rule out a tear given his current symptoms.   He is to

## 2020-11-09 ENCOUNTER — HOSPITAL ENCOUNTER (OUTPATIENT)
Dept: ULTRASOUND IMAGING | Facility: HOSPITAL | Age: 40
Discharge: HOME | End: 2020-11-09
Attending: PODIATRIST
Payer: COMMERCIAL

## 2020-11-09 DIAGNOSIS — M79.671 RIGHT FOOT PAIN: ICD-10-CM

## 2020-11-09 DIAGNOSIS — M79.672 LEFT FOOT PAIN: ICD-10-CM

## 2020-11-09 DIAGNOSIS — M72.2 PLANTAR FASCIITIS: ICD-10-CM

## 2020-11-09 PROCEDURE — 76882 US LMTD JT/FCL EVL NVASC XTR: CPT | Performed by: PODIATRIST

## 2020-11-11 ENCOUNTER — TELEPHONE (OUTPATIENT)
Dept: PODIATRY CLINIC | Facility: CLINIC | Age: 40
End: 2020-11-11

## 2020-11-11 NOTE — TELEPHONE ENCOUNTER
Called patient regarding the results of his recent ultrasound studies and left a message for him to call the office back to make an appointment for follow-up and to go over the results.

## 2020-11-13 ENCOUNTER — OFFICE VISIT (OUTPATIENT)
Dept: PODIATRY CLINIC | Facility: CLINIC | Age: 40
End: 2020-11-13

## 2020-11-13 DIAGNOSIS — M79.672 ARCH PAIN OF LEFT FOOT: ICD-10-CM

## 2020-11-13 DIAGNOSIS — M19.079 PRIMARY OSTEOARTHRITIS OF FOOT, UNSPECIFIED LATERALITY: ICD-10-CM

## 2020-11-13 DIAGNOSIS — M79.671 RIGHT FOOT PAIN: ICD-10-CM

## 2020-11-13 DIAGNOSIS — R73.03 PREDIABETES: ICD-10-CM

## 2020-11-13 DIAGNOSIS — M79.672 LEFT FOOT PAIN: Primary | ICD-10-CM

## 2020-11-13 DIAGNOSIS — M79.671 ARCH PAIN OF RIGHT FOOT: ICD-10-CM

## 2020-11-13 DIAGNOSIS — M72.2 PLANTAR FASCIITIS: ICD-10-CM

## 2020-11-13 DIAGNOSIS — M20.5X9 HALLUX LIMITUS, UNSPECIFIED LATERALITY: ICD-10-CM

## 2020-11-13 PROCEDURE — 99213 OFFICE O/P EST LOW 20 MIN: CPT | Performed by: PODIATRIST

## 2020-11-13 RX ORDER — NAPROXEN 500 MG/1
500 TABLET ORAL 2 TIMES DAILY WITH MEALS
Qty: 30 TABLET | Refills: 0 | Status: SHIPPED | OUTPATIENT
Start: 2020-11-13 | End: 2020-12-14

## 2020-11-13 NOTE — PROGRESS NOTES
Dev Pastor is a 38year old male. Patient presents with:  Test Results: US results. HPI:     This 48-year-old male patient presents to clinic today for follow-up of his recent diagnostic ultrasound studies.   He states he no longer has file      Highest education level: Not on file    Tobacco Use      Smoking status: Never Smoker      Smokeless tobacco: Never Used    Substance and Sexual Activity      Alcohol use: No        Alcohol/week: 0.0 standard drinks      Drug use: No    Other Top AUTH    Nontraumatic tear of plantar fascia  -     [facility] PROC FOR MANAGED CARE AUTH    Hallux limitus, unspecified laterality  -     [facility] PROC FOR MANAGED CARE AUTH    Arch pain of left foot  -     [facility] PROC FOR MANAGED CARE AUTH develops any new acute issues. The patient indicates understanding of these issues and agrees to the plan. He will return to clinic for follow-up and possible casting in 1 month. He will return sooner if required. Oanh Mane DPM    11/13/202

## 2020-12-11 ENCOUNTER — OFFICE VISIT (OUTPATIENT)
Dept: PODIATRY CLINIC | Facility: CLINIC | Age: 40
End: 2020-12-11

## 2020-12-11 ENCOUNTER — TELEPHONE (OUTPATIENT)
Dept: PHYSICAL THERAPY | Facility: HOSPITAL | Age: 40
End: 2020-12-11

## 2020-12-11 DIAGNOSIS — M79.672 LEFT FOOT PAIN: Primary | ICD-10-CM

## 2020-12-11 DIAGNOSIS — M20.5X9 HALLUX LIMITUS, UNSPECIFIED LATERALITY: ICD-10-CM

## 2020-12-11 DIAGNOSIS — M79.671 RIGHT FOOT PAIN: ICD-10-CM

## 2020-12-11 DIAGNOSIS — M72.2 PLANTAR FASCIITIS: ICD-10-CM

## 2020-12-11 PROCEDURE — 29799 UNLISTED PX CASTING/STRPG: CPT | Performed by: PODIATRIST

## 2020-12-11 PROCEDURE — L3000 FT INSERT UCB BERKELEY SHELL: HCPCS | Performed by: PODIATRIST

## 2020-12-13 NOTE — PROGRESS NOTES
Joey Cerda is a 38year old male. Patient presents with: Foot Pain: biltateral -- Rates pain 98/48 with certain activites. Orthotic Status: Pt here for orthotic casting.      HPI:     This 44-year-old male patient returns to clinic toda Family History   Problem Relation Age of Onset   • Hypertension Mother    • Genetic Disease Son         gene mutation - pelvic kidney   • Neurological Disorder Son         seizures      Social History    Socioeconomic History      Marital status:  pain    Plantar fasciitis    Hallux limitus, unspecified laterality         Plan: Discussed the clinical findings and reviewed the treatment options and plan.   He was casted for a custom made pair of orthotic devices today which are medically necessary in

## 2020-12-14 ENCOUNTER — TELEPHONE (OUTPATIENT)
Dept: PHYSICAL THERAPY | Facility: HOSPITAL | Age: 40
End: 2020-12-14

## 2020-12-14 ENCOUNTER — LAB ENCOUNTER (OUTPATIENT)
Dept: LAB | Age: 40
End: 2020-12-14
Attending: FAMILY MEDICINE
Payer: COMMERCIAL

## 2020-12-14 ENCOUNTER — OFFICE VISIT (OUTPATIENT)
Dept: FAMILY MEDICINE CLINIC | Facility: CLINIC | Age: 40
End: 2020-12-14

## 2020-12-14 VITALS
SYSTOLIC BLOOD PRESSURE: 122 MMHG | RESPIRATION RATE: 16 BRPM | HEIGHT: 64 IN | WEIGHT: 190.81 LBS | DIASTOLIC BLOOD PRESSURE: 72 MMHG | TEMPERATURE: 98 F | BODY MASS INDEX: 32.58 KG/M2 | HEART RATE: 82 BPM

## 2020-12-14 DIAGNOSIS — G47.00 INSOMNIA, UNSPECIFIED TYPE: ICD-10-CM

## 2020-12-14 DIAGNOSIS — E55.9 VITAMIN D DEFICIENCY: ICD-10-CM

## 2020-12-14 DIAGNOSIS — G58.9 MONONEUROPATHY: Primary | ICD-10-CM

## 2020-12-14 DIAGNOSIS — R53.82 CHRONIC FATIGUE: ICD-10-CM

## 2020-12-14 DIAGNOSIS — E53.8 VITAMIN B12 DEFICIENCY: ICD-10-CM

## 2020-12-14 DIAGNOSIS — L63.9 ALOPECIA AREATA: ICD-10-CM

## 2020-12-14 DIAGNOSIS — E78.1 PURE HYPERGLYCERIDEMIA: ICD-10-CM

## 2020-12-14 PROCEDURE — 82306 VITAMIN D 25 HYDROXY: CPT

## 2020-12-14 PROCEDURE — 99214 OFFICE O/P EST MOD 30 MIN: CPT | Performed by: FAMILY MEDICINE

## 2020-12-14 PROCEDURE — 80053 COMPREHEN METABOLIC PANEL: CPT

## 2020-12-14 PROCEDURE — 3008F BODY MASS INDEX DOCD: CPT | Performed by: FAMILY MEDICINE

## 2020-12-14 PROCEDURE — 84443 ASSAY THYROID STIM HORMONE: CPT

## 2020-12-14 PROCEDURE — 36415 COLL VENOUS BLD VENIPUNCTURE: CPT

## 2020-12-14 PROCEDURE — 3078F DIAST BP <80 MM HG: CPT | Performed by: FAMILY MEDICINE

## 2020-12-14 PROCEDURE — 82607 VITAMIN B-12: CPT

## 2020-12-14 PROCEDURE — 3074F SYST BP LT 130 MM HG: CPT | Performed by: FAMILY MEDICINE

## 2020-12-14 RX ORDER — NAPROXEN 500 MG/1
500 TABLET ORAL 2 TIMES DAILY WITH MEALS
COMMUNITY
End: 2021-01-13

## 2020-12-14 NOTE — PROGRESS NOTES
Patient presents with:   Foot Pain: Bilateral Tingling, Primarily left foot  Derm Problem: 2 white patches behind the head  Sleep Problem: alert at night, unable to sleep  Immunization/Injection: Declines Flu Vaccine     HPI:   [employer] peripheral pulses normal, no pedal edema, no clubbing or cyanosis  Skin - patches of bald back of head    Results for orders placed or performed in visit on 02/28/20   TSH W REFLEX TO FREE T4    Collection Time: 02/28/20  8:17 AM   Result Value Ref Range the Advil) if needed       Mansoor Norris M.D.   EMG 3  12/14/20

## 2020-12-15 ENCOUNTER — PATIENT MESSAGE (OUTPATIENT)
Dept: PODIATRY CLINIC | Facility: CLINIC | Age: 40
End: 2020-12-15

## 2020-12-15 ENCOUNTER — APPOINTMENT (OUTPATIENT)
Dept: PHYSICAL THERAPY | Age: 40
End: 2020-12-15
Attending: PODIATRIST
Payer: COMMERCIAL

## 2020-12-15 DIAGNOSIS — G58.9 MONONEUROPATHY: Primary | ICD-10-CM

## 2020-12-16 RX ORDER — NAPROXEN 500 MG/1
500 TABLET ORAL 2 TIMES DAILY WITH MEALS
Qty: 60 TABLET | Refills: 0 | Status: SHIPPED | OUTPATIENT
Start: 2020-12-16 | End: 2021-01-14

## 2020-12-19 NOTE — TELEPHONE ENCOUNTER
From: Aron Rodríguez  To: Dang Mendosa MD  Sent: 9/4/2020 1:26 PM CDT  Subject: Referral Request    Hi Dr. sAh Sewell,  [facility], [address] [facility] [phone]) is booked through late October.  Would you please refer me t

## 2020-12-22 ENCOUNTER — OFFICE VISIT (OUTPATIENT)
Dept: PHYSICAL THERAPY | Age: 40
End: 2020-12-22
Attending: PODIATRIST
Payer: COMMERCIAL

## 2020-12-22 DIAGNOSIS — M72.2 PLANTAR FASCIITIS: ICD-10-CM

## 2020-12-22 DIAGNOSIS — M79.672 LEFT FOOT PAIN: ICD-10-CM

## 2020-12-22 DIAGNOSIS — M79.671 RIGHT FOOT PAIN: ICD-10-CM

## 2020-12-22 PROCEDURE — 97161 PT EVAL LOW COMPLEX 20 MIN: CPT

## 2020-12-22 NOTE — PROGRESS NOTES
LOWER EXTREMITY EVALUATION:   Referring Physician: Dr. Miranda Dorsey  Diagnosis: bilateral foot pain- chronic; tingling in L>R foot     Date of Service: 12/22/2020     PATIENT Sabino Gallo is a 38year old male who presents to therapy to Denies any other pains. He states just last night he started feeling some tingling in his fingers only on the L- only the middle fingers. Denies any spine pain or any other joint pains.    Tingling: can not find any pattern to when it increases or decre HEP.  Pt voiced understanding and performs HEP correctly without reported pain. Skilled Physical Therapy is medically necessary to address the above impairments and reach functional goals.      Precautions:  None  OBJECTIVE:   Observation: unremarkable   Pa consistent walking program up to 20 min so long as this is pain free. - working to transition back to longer walking/jogging tolerance.      Charges: PT Eval Low Complexity      Total Timed Treatment: 5 min     Total Treatment Time: 45 min     PLAN OF CARE: Date____________________    Certification From: 69/73/4931  To:3/22/2021

## 2020-12-24 ENCOUNTER — OFFICE VISIT (OUTPATIENT)
Dept: PHYSICAL THERAPY | Age: 40
End: 2020-12-24
Attending: PODIATRIST
Payer: COMMERCIAL

## 2020-12-24 DIAGNOSIS — M72.2 PLANTAR FASCIITIS: ICD-10-CM

## 2020-12-24 DIAGNOSIS — M79.671 RIGHT FOOT PAIN: ICD-10-CM

## 2020-12-24 DIAGNOSIS — M79.672 LEFT FOOT PAIN: ICD-10-CM

## 2020-12-24 PROCEDURE — 97110 THERAPEUTIC EXERCISES: CPT

## 2020-12-24 NOTE — PROGRESS NOTES
Dx: bilateral foot pain- chronic; tingling in L>R foot          Insurance (Authorized # of Visits):   [facility] [address] Physician: Dr. Jg Gutierres  Next MD visit: none scheduled  Fall Risk: standard         Precautions: n/a             Subjective: Pt TX#: 3/ Date:                 TX#: 4/ Date:                 TX#: 5/ Date:    Tx#: 6/   TM: walking 3m ph 2 min; jogging 4 mph 1 min        There ex:   Seated toe scrunches 20 reps   Seated green band INV 20 reps R/L   Seated green band DF 20 reps R/L

## 2020-12-29 ENCOUNTER — OFFICE VISIT (OUTPATIENT)
Dept: PHYSICAL THERAPY | Age: 40
End: 2020-12-29
Attending: PODIATRIST
Payer: COMMERCIAL

## 2020-12-29 DIAGNOSIS — M72.2 PLANTAR FASCIITIS: ICD-10-CM

## 2020-12-29 DIAGNOSIS — M79.672 LEFT FOOT PAIN: ICD-10-CM

## 2020-12-29 DIAGNOSIS — M79.671 RIGHT FOOT PAIN: ICD-10-CM

## 2020-12-29 PROCEDURE — 97110 THERAPEUTIC EXERCISES: CPT

## 2020-12-29 NOTE — PROGRESS NOTES
Dx: bilateral foot pain- chronic; tingling in L>R foot          Insurance (Authorized # of Visits):   O 8 auth           Authorizing Physician: Dr. Anjum Jorge  Next MD visit: none scheduled  Fall Risk: standard         Precautions: n/a             Subjective:He jogging 4 mph 1 min  Upright bike level 2 - 5 min  Attempt elliptical     There ex:   Seated toe scrunches 20 reps   Seated green band INV 20 reps R/L   Seated green band DF 20 reps R/L   SLS on foam 30 sec hold x 3 sets R/L   Standing lateral walk green b

## 2021-01-04 ENCOUNTER — TELEPHONE (OUTPATIENT)
Dept: PHYSICAL THERAPY | Facility: HOSPITAL | Age: 41
End: 2021-01-04

## 2021-01-05 ENCOUNTER — APPOINTMENT (OUTPATIENT)
Dept: PHYSICAL THERAPY | Age: 41
End: 2021-01-05
Attending: PODIATRIST
Payer: COMMERCIAL

## 2021-01-07 ENCOUNTER — OFFICE VISIT (OUTPATIENT)
Dept: PHYSICAL THERAPY | Age: 41
End: 2021-01-07
Attending: PODIATRIST
Payer: COMMERCIAL

## 2021-01-07 PROCEDURE — 97110 THERAPEUTIC EXERCISES: CPT

## 2021-01-07 NOTE — PROGRESS NOTES
Dx: bilateral foot pain- chronic; tingling in L>R foot          Insurance (Authorized # of Visits):   O 8 auth           Authorizing Physician: Dr. Terra Ibrahim MD visit: none scheduled  Fall Risk: standard         Precautions: n/a             Subjective:He walking 3m ph 2 min; jogging 4 mph 1 min  Upright bike level 2 - 5 min  Attempt elliptical    Upright bike 5 min      There ex:   Seated toe scrunches 20 reps   Seated green band INV 20 reps R/L   Seated green band DF 20 reps R/L   SLS on foam 30 sec hold

## 2021-01-11 ENCOUNTER — TELEPHONE (OUTPATIENT)
Dept: PHYSICAL THERAPY | Facility: HOSPITAL | Age: 41
End: 2021-01-11

## 2021-01-12 ENCOUNTER — OFFICE VISIT (OUTPATIENT)
Dept: PHYSICAL THERAPY | Age: 41
End: 2021-01-12
Attending: PODIATRIST
Payer: COMMERCIAL

## 2021-01-12 PROCEDURE — 97110 THERAPEUTIC EXERCISES: CPT

## 2021-01-12 NOTE — PROGRESS NOTES
Dx: bilateral foot pain- chronic; tingling in L>R foot          Insurance (Authorized # of Visits):   O 8 auth           Authorizing Physician: Dr. Jg Gutierres  Next MD visit: none scheduled  Fall Risk: standard         Precautions: n/a             Subjective:He foot/ankle strengthening- consider heel raises and or TM progressions if no pain   Date: 12/24/2020  TX#: 2/8 Date: 12/29/2020           TX#: 3/8 Date:  1/7/2021                TX#: 4/8 Date: 1/12/2021             TX#: 5/8 Date:    Tx#: 6/8   TM: walking 3m foot, elliptical vs TM transition.   Pt education: updated POC, progress    Taping: posterior tibialis taping 5 min        HEP: SLS on foam; seated toe scrunches; green band INV; green band DF  1/7/2021 thera roll to bottom of foot, TM or elliptical no more

## 2021-01-13 ENCOUNTER — TELEPHONE (OUTPATIENT)
Dept: PODIATRY CLINIC | Facility: CLINIC | Age: 41
End: 2021-01-13

## 2021-01-14 ENCOUNTER — OFFICE VISIT (OUTPATIENT)
Dept: NEUROLOGY | Facility: CLINIC | Age: 41
End: 2021-01-14

## 2021-01-14 ENCOUNTER — APPOINTMENT (OUTPATIENT)
Dept: PHYSICAL THERAPY | Age: 41
End: 2021-01-14
Attending: PODIATRIST
Payer: COMMERCIAL

## 2021-01-14 VITALS
RESPIRATION RATE: 16 BRPM | HEART RATE: 74 BPM | SYSTOLIC BLOOD PRESSURE: 128 MMHG | BODY MASS INDEX: 32 KG/M2 | DIASTOLIC BLOOD PRESSURE: 76 MMHG | WEIGHT: 189 LBS

## 2021-01-14 DIAGNOSIS — R20.9 DISTURBANCE OF SKIN SENSATION: Primary | ICD-10-CM

## 2021-01-14 PROCEDURE — 3078F DIAST BP <80 MM HG: CPT | Performed by: OTHER

## 2021-01-14 PROCEDURE — 3074F SYST BP LT 130 MM HG: CPT | Performed by: OTHER

## 2021-01-14 PROCEDURE — 99213 OFFICE O/P EST LOW 20 MIN: CPT | Performed by: OTHER

## 2021-01-14 RX ORDER — NAPROXEN 500 MG/1
500 TABLET ORAL 2 TIMES DAILY WITH MEALS
COMMUNITY
End: 2021-06-04

## 2021-01-14 NOTE — PROGRESS NOTES
Neurology H&P    Quique 52 Patient Status:  No patient class for patient encounter    1980 MRN [id number]   Location [address], [address] [place] [address], [address] [place] [address] Attending No att. providers found   Hosp Day # 0 PCP Carie Velarde been seeing a podiatrist as well. HE also tells me that he has had a mild tingling in the distal L finger tips but this is intermittent. He has no weakness. He is seeing PT.  He states that a couple of time he had tingling sensation int he R toes as well bu Neurological Disorder Son         seizures       ROS:  Gen: no unexplained weight loss  Vision: no vision changes, no new blurry or double vision  Head and Neck: no eye or ear discharge  Pulmonary: no SOB, no new cough  CV: no chest pain, no new lower extr 986 pg/mL 392          Imaging:  No recent CNS imaging to review      Assessment: This is a 37 y/o male with numbness and tingling in the L toes and ball of his foot and rare tingling in the very distal L finger tips.  Also may have had symptoms in the R t

## 2021-01-15 ENCOUNTER — OFFICE VISIT (OUTPATIENT)
Dept: PODIATRY CLINIC | Facility: CLINIC | Age: 41
End: 2021-01-15

## 2021-01-15 VITALS — WEIGHT: 188 LBS | HEIGHT: 63 IN | BODY MASS INDEX: 33.31 KG/M2

## 2021-01-15 DIAGNOSIS — M72.2 PLANTAR FASCIITIS: Primary | ICD-10-CM

## 2021-01-15 PROCEDURE — 99212 OFFICE O/P EST SF 10 MIN: CPT | Performed by: PODIATRIST

## 2021-01-15 PROCEDURE — 3008F BODY MASS INDEX DOCD: CPT | Performed by: PODIATRIST

## 2021-01-15 NOTE — PROGRESS NOTES
Avelino Hickey is a 38year old male. Patient presents with: Foot Pain: orthotics      HPI:     This 27-year-old male patient returns to clinic today in follow-up and orthotic dispensement.   He states he continues to have foot pain but that Drug use: No    Other Topics      Concerns:         Service: No        Caffeine Concern: Yes          2xdaily, coffee/soda        Exercise: Yes          1-2xweek        Seat Belt: Yes          REVIEW OF SYSTEMS:   Review of Systems    Today review He is to continue physical therapy as directed, ice the affected areas when necessary as directed, avoid going barefoot, and to wear his good supportive shoes with the custom orthotics.   He will monitor both of his feet and will inform the office if signs/

## 2021-01-19 ENCOUNTER — OFFICE VISIT (OUTPATIENT)
Dept: PHYSICAL THERAPY | Age: 41
End: 2021-01-19
Attending: PODIATRIST
Payer: COMMERCIAL

## 2021-01-19 PROCEDURE — 97110 THERAPEUTIC EXERCISES: CPT

## 2021-01-19 NOTE — PROGRESS NOTES
Dx: bilateral foot pain- chronic; tingling in L>R foot          Insurance (Authorized # of Visits):   O 8 auth           Authorizing Physician: Dr. Mark Ibrahim MD visit: none scheduled  Fall Risk: standard         Precautions: n/a             Subjective:He ph 2 min; jogging 4 mph 1 min  Upright bike level 2 - 5 min  Attempt elliptical    Upright bike 5 min  TM walking 2.5 mph 5 min  TM walking 2.5 mph 5 min , tlingling more but no pain     There ex:   Seated toe scrunches 20 reps   Seated green band INV 20 r cleats and running shoes), discussed what to look for in a good pain of running shoes Pt education: plan for the next week, how to progress self, monitor symptoms for readiness to progress to the next stage of hEP, what he should and should not fee.   Pt eu

## 2021-01-20 ENCOUNTER — APPOINTMENT (OUTPATIENT)
Dept: PHYSICAL THERAPY | Age: 41
End: 2021-01-20
Attending: PODIATRIST
Payer: COMMERCIAL

## 2021-01-22 ENCOUNTER — APPOINTMENT (OUTPATIENT)
Dept: PHYSICAL THERAPY | Age: 41
End: 2021-01-22
Attending: PODIATRIST
Payer: COMMERCIAL

## 2021-01-25 ENCOUNTER — OFFICE VISIT (OUTPATIENT)
Dept: PHYSICAL THERAPY | Age: 41
End: 2021-01-25
Attending: PODIATRIST
Payer: COMMERCIAL

## 2021-01-25 PROCEDURE — 97110 THERAPEUTIC EXERCISES: CPT

## 2021-01-25 NOTE — PROGRESS NOTES
Dx: bilateral foot pain- chronic; tingling in L>R foot          Insurance (Authorized # of Visits):   [facility] 8 auth           Authorizing Physician: Dr. Re Haskins  Next MD visit: none scheduled  Fall Risk: standard         Precautions: n/a            Progress Summar for your referral. If you have any questions, please contact me at Dept: [phone]    Sincerely,  Electronically signed by therapist: Hudson Dalton PT   Date: 12/24/2020  TX#: 2/8 Date: 12/29/2020           TX#: 3/8 Date:  1/7/2021                TX#: 30 sec hold x 2 sets R/L   Seated theraroll 3 min R/L  Scrunches 20 reps R/L  Lateral walk green band at ankles 20' x 4 reps  Monster walk band at ankles green 20' x 4 reps   SLS 30 sec hold R/L  On foam 30 sec hold R/L  With ball throw 20 reps R/L   Stand

## 2021-01-26 DIAGNOSIS — E78.1 HYPERTRIGLYCERIDEMIA: ICD-10-CM

## 2021-01-27 ENCOUNTER — APPOINTMENT (OUTPATIENT)
Dept: PHYSICAL THERAPY | Age: 41
End: 2021-01-27
Attending: PODIATRIST
Payer: COMMERCIAL

## 2021-01-27 DIAGNOSIS — E78.1 HYPERTRIGLYCERIDEMIA: ICD-10-CM

## 2021-01-27 RX ORDER — SIMVASTATIN 20 MG
TABLET ORAL
Qty: 90 TABLET | Refills: 0 | Status: SHIPPED | OUTPATIENT
Start: 2021-01-27 | End: 2021-04-23

## 2021-01-27 RX ORDER — SIMVASTATIN 20 MG
TABLET ORAL
Qty: 30 TABLET | Refills: 0 | Status: SHIPPED | OUTPATIENT
Start: 2021-01-27 | End: 2021-01-27

## 2021-01-27 NOTE — TELEPHONE ENCOUNTER
Requested Prescriptions     Pending Prescriptions Disp Refills   • SIMVASTATIN 20 MG Oral Tab [Pharmacy Med Name: SIMVASTATIN 20MG TABLETS] 90 tablet 0     Sig: TAKE 1 TABLET(20 MG) BY MOUTH EVERY NIGHT     LOV 12/14/2020     Patient was asked to follow-up

## 2021-01-29 ENCOUNTER — APPOINTMENT (OUTPATIENT)
Dept: PHYSICAL THERAPY | Age: 41
End: 2021-01-29
Attending: PODIATRIST
Payer: COMMERCIAL

## 2021-02-03 ENCOUNTER — APPOINTMENT (OUTPATIENT)
Dept: PHYSICAL THERAPY | Age: 41
End: 2021-02-03
Attending: PODIATRIST
Payer: COMMERCIAL

## 2021-02-05 ENCOUNTER — APPOINTMENT (OUTPATIENT)
Dept: PHYSICAL THERAPY | Age: 41
End: 2021-02-05
Attending: PODIATRIST
Payer: COMMERCIAL

## 2021-02-10 ENCOUNTER — OFFICE VISIT (OUTPATIENT)
Dept: PHYSICAL THERAPY | Age: 41
End: 2021-02-10
Attending: PODIATRIST
Payer: COMMERCIAL

## 2021-02-10 PROCEDURE — 97110 THERAPEUTIC EXERCISES: CPT

## 2021-02-10 NOTE — PROGRESS NOTES
Dx: bilateral foot pain- chronic; tingling in L>R foot          Insurance (Authorized # of Visits):   O 8 auth           Authorizing Physician: Dr. George Urbano  Next MD visit: none scheduled  Fall Risk: standard         Precautions: n/a             Subjective:He jogging 4 mph 1 min  Upright bike level 2 - 5 min  Attempt elliptical    Upright bike 5 min  TM walking 2.5 mph 5 min  TM walking 2.5 mph 5 min , tlingling more but no pain  TM walking 2.5 mph 5 min , tlingling more but no pain  TM walking 3 mph 5 min    T There ex:   Seated Black band INV 20 reps R/L   Seated BLACK band DF 20 reps R/L   Seated green band PF with toe scrunches 20 reps; blue band 20 reps - no pain   Calf stretch long sitting 30 sec hold x 2 sets R/L    Standing forward/back rockerboard 2 min; standing desk sit if you start to feel pain but otherwise welcome to stand as long as you can tolerate if you are in your orthotics/tennis shoes.      Exercises  Standing Single Leg Heel Raise - 10 reps - 2 sets - 1 hold - 1x daily - 7x weekly  Single Leg S

## 2021-02-12 ENCOUNTER — HOSPITAL ENCOUNTER (OUTPATIENT)
Age: 41
Discharge: HOME | End: 2021-02-12
Payer: COMMERCIAL

## 2021-02-12 VITALS
HEART RATE: 92 BPM | SYSTOLIC BLOOD PRESSURE: 110 MMHG | OXYGEN SATURATION: 97 % | WEIGHT: 186 LBS | RESPIRATION RATE: 18 BRPM | TEMPERATURE: 98 F | HEIGHT: 63 IN | DIASTOLIC BLOOD PRESSURE: 82 MMHG | BODY MASS INDEX: 32.96 KG/M2

## 2021-02-12 DIAGNOSIS — R20.2 PARESTHESIA OF FINGER: Primary | ICD-10-CM

## 2021-02-12 PROCEDURE — 99213 OFFICE O/P EST LOW 20 MIN: CPT | Performed by: PHYSICIAN ASSISTANT

## 2021-02-12 PROCEDURE — L3908 WHO COCK-UP NONMOLDE PRE OTS: HCPCS | Performed by: PHYSICIAN ASSISTANT

## 2021-02-12 NOTE — ED PROVIDER NOTES
Patient Seen in: [facility]      History   Patient presents with:   Other: Burning sensation to fingers    Stated Complaint: BURNING SENSATION IN FINGERS    HPI/Subjective:   HPI    40-year-old male presents to the [address] [place] [address] for evaluation o Pulmonary:      Effort: Pulmonary effort is normal.   Musculoskeletal:      Comments: Bilateral wrists and hands with full range of motion and 5/5 strength. 2+ radial pulses bilaterally. Negative Phalen's and Tinel's bilaterally.    Skin:     General: S

## 2021-02-12 NOTE — ED INITIAL ASSESSMENT (HPI)
Burning sensation to 1,2,3 finger tips, started last noc. Denies any recent exposure to cold but thinks he may have touched hot plate or spicy peppers w finger tips. Denies any toe burning.

## 2021-02-22 ENCOUNTER — OFFICE VISIT (OUTPATIENT)
Dept: PHYSICAL THERAPY | Age: 41
End: 2021-02-22
Attending: PODIATRIST
Payer: COMMERCIAL

## 2021-02-22 PROCEDURE — 97110 THERAPEUTIC EXERCISES: CPT

## 2021-02-22 NOTE — PROGRESS NOTES
Dx: bilateral foot pain- chronic; tingling in L>R foot          Insurance (Authorized # of Visits):   O 8 auth           Authorizing Physician: Dr. Jg Gutierres  Next MD visit: none scheduled  Fall Risk: standard         Precautions: n/a             Subjective:He TX#: 5/11 Date: 1/19/2021   Tx#: 6/11 1/25/2021   Tx#: 7/11 2/10/2021   Tx#: 7/10 2/22/2021   Tx#: 8/10   Attempt elliptical    Upright bike 5 min  TM walking 2.5 mph 5 min  TM walking 2.5 mph 5 min , tlingling more but no pain  TM walking 2.5 mph 5 mi leg heel raises 10 reps   Seated rolling 2 min bilaterally     *session performed with WB activities having orthotics in place   There ex:   Seated Black band INV 20 reps R/L   Seated BLACK band DF 20 reps R/L  Seated BLACK band MI 20 reps with toe scrunch Leg Stance on Foam Pad - 1 reps - 3 sets - 30 hold - 1x daily - 7x weekly  Ankle and Toe Plantarflexion with Resistance - 20 reps - 2 sets - 1x daily - 7x weekly  Ankle Inversion with Resistance - 20 reps - 2 sets - 1x daily - 7x weekly  Standing Gastroc S

## 2021-03-04 ENCOUNTER — PATIENT MESSAGE (OUTPATIENT)
Dept: FAMILY MEDICINE CLINIC | Facility: CLINIC | Age: 41
End: 2021-03-04

## 2021-03-04 DIAGNOSIS — M75.41 SHOULDER IMPINGEMENT SYNDROME, RIGHT: Primary | ICD-10-CM

## 2021-03-04 NOTE — TELEPHONE ENCOUNTER
From: [facility]  To: Priya Gibbs MD  Sent: 3/4/2021 4:22 PM CST  Subject: Referral Request    [facility] Dr. Melanie Lundberg,  I started to feel that pain in my right shoulder joint again.  I would appreciate you placing a referral for physical th

## 2021-03-10 ENCOUNTER — OFFICE VISIT (OUTPATIENT)
Dept: PHYSICAL THERAPY | Age: 41
End: 2021-03-10
Attending: FAMILY MEDICINE
Payer: COMMERCIAL

## 2021-03-10 ENCOUNTER — APPOINTMENT (OUTPATIENT)
Dept: PHYSICAL THERAPY | Age: 41
End: 2021-03-10
Attending: PODIATRIST
Payer: COMMERCIAL

## 2021-03-10 PROCEDURE — 97110 THERAPEUTIC EXERCISES: CPT

## 2021-03-10 PROCEDURE — 97161 PT EVAL LOW COMPLEX 20 MIN: CPT

## 2021-03-11 NOTE — PROGRESS NOTES
SHOULDER EVALUATION:   Referring Physician: Dr. Huong Montesinos  Diagnosis: R IR strain     Date of Service: 3/10/2021     PATIENT SUMMARY   Melecio Mcconnell is a 38year old male who presents to therapy today with complaints of R shoulder pain startin Work and recreational activities lifting weights with upper body- afraid to get pain. Upper body workout routine in the last month - rubber band resistance, he started this but mini zed it after the pain. He states he only does it 5 min or so a day.  Bef None  OBJECTIVE:   Observation/Posture: forward shoulders but with cues is able to retract, some lumbar extension compensations in standing   Palpation: no tenderness to pec major, minor, biceps tendon, AC joint, or rotator cuff insertions at this time on 2 sets - 10 reps      Charges: PT Eval Low Complexity, there ex: 1      Total Timed Treatment: 15 min     Total Treatment Time: 45 min     PLAN OF CARE:    Goals: (to be met in 8 visits)   · Pt will report improved ability to sleep without waking due to sh

## 2021-03-17 ENCOUNTER — OFFICE VISIT (OUTPATIENT)
Dept: PHYSICAL THERAPY | Age: 41
End: 2021-03-17
Attending: PODIATRIST
Payer: COMMERCIAL

## 2021-03-17 PROCEDURE — 97110 THERAPEUTIC EXERCISES: CPT

## 2021-03-17 NOTE — PROGRESS NOTES
Dx:R IR strain            Insurance (Authorized # of Visits):  8 visits auth for shoulder           Authorizing Physician: Dr. Jg Gutierres  Next MD visit: none scheduled  Fall Risk: standard         Precautions: n/a             Subjective: Pt states shoulder will row 10 reps   Standing chest stretch- deferred   Standing 90/90 ER yellow band 10 reps            Pt education: answered all questions and concerns pre and post treatment; updated HEP, recommendations on what to feel and what not to feel, likely impairment

## 2021-03-22 ENCOUNTER — LABORATORY ENCOUNTER (OUTPATIENT)
Dept: LAB | Age: 41
End: 2021-03-22
Attending: FAMILY MEDICINE
Payer: COMMERCIAL

## 2021-03-22 ENCOUNTER — TELEPHONE (OUTPATIENT)
Dept: PHYSICAL THERAPY | Facility: HOSPITAL | Age: 41
End: 2021-03-22

## 2021-03-22 ENCOUNTER — APPOINTMENT (OUTPATIENT)
Dept: PHYSICAL THERAPY | Age: 41
End: 2021-03-22
Attending: FAMILY MEDICINE
Payer: COMMERCIAL

## 2021-03-22 DIAGNOSIS — R73.03 PREDIABETES: ICD-10-CM

## 2021-03-22 DIAGNOSIS — R53.82 CHRONIC FATIGUE: ICD-10-CM

## 2021-03-22 DIAGNOSIS — E78.1 PURE HYPERGLYCERIDEMIA: ICD-10-CM

## 2021-03-22 LAB
BASOPHILS # BLD AUTO: 0.05 X10(3) UL (ref 0–0.2)
BASOPHILS NFR BLD AUTO: 0.8 %
CHOLEST SMN-MCNC: 147 MG/DL (ref ?–200)
DEPRECATED RDW RBC AUTO: 38.4 FL (ref 35.1–46.3)
EOSINOPHIL # BLD AUTO: 0.15 X10(3) UL (ref 0–0.7)
EOSINOPHIL NFR BLD AUTO: 2.4 %
ERYTHROCYTE [DISTWIDTH] IN BLOOD BY AUTOMATED COUNT: 13 % (ref 11–15)
EST. AVERAGE GLUCOSE BLD GHB EST-MCNC: 123 MG/DL (ref 68–126)
HBA1C MFR BLD HPLC: 5.9 % (ref ?–5.7)
HCT VFR BLD AUTO: 44.9 %
HDLC SERPL-MCNC: 32 MG/DL (ref 40–59)
HGB BLD-MCNC: 14.4 G/DL
IMM GRANULOCYTES # BLD AUTO: 0.01 X10(3) UL (ref 0–1)
IMM GRANULOCYTES NFR BLD: 0.2 %
LDLC SERPL CALC-MCNC: 84 MG/DL (ref ?–100)
LDLC SERPL DIRECT ASSAY-MCNC: 90 MG/DL (ref ?–100)
LYMPHOCYTES # BLD AUTO: 2.65 X10(3) UL (ref 1–4)
LYMPHOCYTES NFR BLD AUTO: 42.9 %
MCH RBC QN AUTO: 26.4 PG (ref 26–34)
MCHC RBC AUTO-ENTMCNC: 32.1 G/DL (ref 31–37)
MCV RBC AUTO: 82.2 FL
MONOCYTES # BLD AUTO: 0.35 X10(3) UL (ref 0.1–1)
MONOCYTES NFR BLD AUTO: 5.7 %
NEUTROPHILS # BLD AUTO: 2.96 X10 (3) UL (ref 1.5–7.7)
NEUTROPHILS # BLD AUTO: 2.96 X10(3) UL (ref 1.5–7.7)
NEUTROPHILS NFR BLD AUTO: 48 %
NONHDLC SERPL-MCNC: 115 MG/DL (ref ?–130)
PATIENT FASTING Y/N/NP: YES
PLATELET # BLD AUTO: 233 10(3)UL (ref 150–450)
RBC # BLD AUTO: 5.46 X10(6)UL
TRIGL SERPL-MCNC: 154 MG/DL (ref 30–149)
VLDLC SERPL CALC-MCNC: 31 MG/DL (ref 0–30)
WBC # BLD AUTO: 6.2 X10(3) UL (ref 4–11)

## 2021-03-22 PROCEDURE — 83721 ASSAY OF BLOOD LIPOPROTEIN: CPT

## 2021-03-22 PROCEDURE — 85025 COMPLETE CBC W/AUTO DIFF WBC: CPT

## 2021-03-22 PROCEDURE — 80061 LIPID PANEL: CPT

## 2021-03-22 PROCEDURE — 36415 COLL VENOUS BLD VENIPUNCTURE: CPT

## 2021-03-22 PROCEDURE — 83036 HEMOGLOBIN GLYCOSYLATED A1C: CPT

## 2021-03-24 ENCOUNTER — APPOINTMENT (OUTPATIENT)
Dept: PHYSICAL THERAPY | Age: 41
End: 2021-03-24
Attending: FAMILY MEDICINE
Payer: COMMERCIAL

## 2021-03-24 ENCOUNTER — OFFICE VISIT (OUTPATIENT)
Dept: PHYSICAL THERAPY | Age: 41
End: 2021-03-24
Attending: PODIATRIST
Payer: COMMERCIAL

## 2021-03-24 PROCEDURE — 97110 THERAPEUTIC EXERCISES: CPT

## 2021-03-24 NOTE — PROGRESS NOTES
Dx:R IR strain            Insurance (Authorized # of Visits):  8 visits auth for shoulder           Authorizing Physician: Dr. Mary Rebollar  Next MD visit: none scheduled  Fall Risk: standard         Precautions: n/a             Subjective: Pt states after last retro hills 5 min       There ex:   Shoulder circles with serratus punch 2# 10 cw 10 ccw; 3# 10 cw 3 CCW   Sidelying ER 10 reps x 3 sets   Prone scapular retraction 10 reps x 2 sets   Standing yellow band IR 10 reps x 2 sets   Standing green band row 20 re

## 2021-04-04 ENCOUNTER — PATIENT MESSAGE (OUTPATIENT)
Dept: FAMILY MEDICINE CLINIC | Facility: CLINIC | Age: 41
End: 2021-04-04

## 2021-04-04 DIAGNOSIS — M79.651 RIGHT THIGH PAIN: Primary | ICD-10-CM

## 2021-04-05 NOTE — TELEPHONE ENCOUNTER
From: [facility]  To: Rahul Armendariz MD  Sent: 4/4/2021 9:27 PM CDT  Subject: Referral Request    Hi Dr. Yolanda Wilkerson,  I might have strained a muscle in my right thigh while playing soccer almost 8 weeks ago.  I allowed myself a rest (not maxi

## 2021-04-06 ENCOUNTER — OFFICE VISIT (OUTPATIENT)
Dept: PHYSICAL THERAPY | Age: 41
End: 2021-04-06
Attending: FAMILY MEDICINE
Payer: COMMERCIAL

## 2021-04-06 ENCOUNTER — APPOINTMENT (OUTPATIENT)
Dept: PHYSICAL THERAPY | Age: 41
End: 2021-04-06
Payer: COMMERCIAL

## 2021-04-06 PROCEDURE — 97110 THERAPEUTIC EXERCISES: CPT

## 2021-04-06 NOTE — PROGRESS NOTES
Dx:R IR strain            Insurance (Authorized # of Visits):  8 visits auth for shoulder           Authorizing Physician: Dr. Suzi Ibrahim MD visit: none scheduled  Fall Risk: standard         Precautions: n/a             Subjective: Pt states feeling bet UBE level 1.5 - forward and retro hills 5 min  UBE level 1.5 - forward and retro hills 5 min  UBE level 1.5 - forward and retro hills 5 min      There ex:   Shoulder circles with serratus punch 2# 10 cw 10 ccw; 3# 10 cw 3 CCW   Sidelying ER 10 reps x 3 s - 10 reps  Shoulder Internal Rotation with Resistance - 1 x daily - 7 x weekly - 2 sets - 10 reps  Seated Thoracic Lumbar Extension - 2 x daily - 7 x weekly - 1 sets - 10 reps - 10 hold  Supine Chest Stretch on Foam Roll - 1 x daily - 7 x weekly - 3 sets -

## 2021-04-08 ENCOUNTER — APPOINTMENT (OUTPATIENT)
Dept: PHYSICAL THERAPY | Age: 41
End: 2021-04-08
Payer: COMMERCIAL

## 2021-04-08 ENCOUNTER — APPOINTMENT (OUTPATIENT)
Dept: PHYSICAL THERAPY | Age: 41
End: 2021-04-08
Attending: FAMILY MEDICINE
Payer: COMMERCIAL

## 2021-04-12 ENCOUNTER — OFFICE VISIT (OUTPATIENT)
Dept: PHYSICAL THERAPY | Age: 41
End: 2021-04-12
Attending: FAMILY MEDICINE
Payer: COMMERCIAL

## 2021-04-12 PROCEDURE — 97110 THERAPEUTIC EXERCISES: CPT

## 2021-04-12 PROCEDURE — 97161 PT EVAL LOW COMPLEX 20 MIN: CPT

## 2021-04-12 NOTE — PROGRESS NOTES
Dx:R IR strain; R hip joint irritation            Insurance (Authorized # of Visits):  8 visits auth for shoulder           Authorizing Physician: Dr. Melanie Lundberg  Next MD visit: none scheduled  Fall Risk: standard         Precautions: n/a            Progress S sneeze, clicking, popping, locking, catching, giving way.      Objective:     Lumbar screen: WFL all directions without reproduction of pain     Hip screen:   FAIR: R: +; L: -   Scour Test: R Rocco Cellar Test: Zeinab Espinoza Test: R , L   Shane's Test: R , L care.    Thank you for your referral. If you have any questions, please contact me at Dept: [phone]    Sincerely,  Electronically signed by therapist: Haydee Torres PT   Date: 3/17/2021  TX#: 2/8 Date:3/24/2021                  TX#: 3/8 Date: 4/6/20 , doing exercises at home bilaterally  Pt education: HEP update             HEP:   Access Code: [email or web address]- shoulder HEP  4/12/2021 SLR, hip abduction, hip extension- OKC- long lever  Charges: there ex: 2; low complexity eval: 1       Total Timed Treatment:

## 2021-04-14 ENCOUNTER — OFFICE VISIT (OUTPATIENT)
Dept: PHYSICAL THERAPY | Age: 41
End: 2021-04-14
Attending: FAMILY MEDICINE
Payer: COMMERCIAL

## 2021-04-14 PROCEDURE — 97110 THERAPEUTIC EXERCISES: CPT

## 2021-04-14 NOTE — PROGRESS NOTES
Dx:R IR strain; R hip joint irritation            Insurance (Authorized # of Visits):  8 visits auth for shoulder           Authorizing Physician: Dr. Doreen Galaviz  Next MD visit: none scheduled  Fall Risk: standard         Precautions: n/a             [place] x 2 sets   Standing green band row 20 reps   Standing yellow band 90/90 row 10 reps   Standing chest stretch- deferred   Standing 90/90 ER yellow band 10 reps      There ex:   Green band row 20 reps   Bilateral ER yellow band 10 reps x 2 sets   Scaption: 1 Resistance at Ankles - 1 x daily - 7 x weekly - 3 sets - 10 reps   Standing Hip Flexion with Resistance Loop - 1 x daily - 7 x weekly - 2 sets - 10 reps   Bridge with Heels on The Eleazar-Enzo - 1 x daily - 7 x weekly - 2 sets - 10 reps    Charges: there ex: 3

## 2021-04-19 ENCOUNTER — OFFICE VISIT (OUTPATIENT)
Dept: PHYSICAL THERAPY | Age: 41
End: 2021-04-19
Attending: FAMILY MEDICINE
Payer: COMMERCIAL

## 2021-04-19 PROCEDURE — 97110 THERAPEUTIC EXERCISES: CPT

## 2021-04-19 NOTE — PROGRESS NOTES
"[facility]  Neurosurgery  Progress Note    Subjective:     History of Present Illness: 67 yo M with PMHx of HTN, HLD, DM, dementia, A. Fib, on coumadin, prior cerebral aneurysm treated 20 years ago at [facility]. Pt who presented [facility] in [place], [place] on 7/9/18 to with sudden-onset headache, right eye deviation, and L sided weakness. Head CT at that time revealed right thalamic hemorrhage.  Pt was treated conservatively without neurosurgical intervention.  Per wife, she was unhappy with the care in [place].  States patient has been very lethargic since he was initially admitted, without improvement,  and felt as though the "nothing was being done for him" She requested that patient be transferred to [facility] for further evaluation/treatment.  Pt was accepted to [facility] by Hospital medicine service, and currently admitted to the floor.  Pt has been lethargic and and unable to FC's since admission.    68 M with R thalamic ICH/IVH (ICH score 2) initially found on 7/9/18 admitted to OSH in [place] w/o intervention.     7/18: Pt has NG tube in place, as unable to swallow.  Pt is DNR.  Prior to his recent hospital admission, wife states patient was functioning well, ambulating normally, no previous weakness/speech difficulty.  He takes aspirin and coumadin at home (on hold now).  HCT was done this morning which shows acute blood with ventriculomegaly.          Post-Op Info:  * No surgery found *         Interval History: NAEON.     Medications:  Continuous Infusions:   sodium chloride 0.9% 75 mL/hr at 07/20/18 1005     Scheduled Meds:   amiodarone  200 mg Per NG tube Daily    amLODIPine  10 mg Per NG tube Daily    atorvastatin  40 mg Per NG tube Daily    hydrALAZINE  25 mg Per NG tube Q8H    insulin detemir U-100  20 Units Subcutaneous QHS    losartan  100 mg Per NG tube Daily    metoprolol tartrate  25 mg Per NG tube BID    senna-docusate 8.6-50 mg  1 tablet Per NG tube Daily     PRN Meds:dextrose 50%, dextrose " Dx:R IR strain; R hip joint irritation            Insurance (Authorized # of Visits):  8 visits auth for shoulder           Authorizing Physician: Dr. Ash Sewell  Next MD visit: none scheduled  Fall Risk: standard         Precautions: n/a             [place] 50%, glucagon (human recombinant), insulin aspart U-100, magnesium oxide, magnesium oxide, potassium chloride 10%, potassium chloride 10%, potassium chloride 10%, potassium, sodium phosphates, potassium, sodium phosphates, potassium, sodium phosphates, sodium chloride 0.9%     Review of Systems  Objective:     Weight: 78.9 kg (173 lb 15.1 oz)  Body mass index is 24.96 kg/m².  Vital Signs (Most Recent):  Temp: 99.1 °F (37.3 °C) (07/20/18 0705)  Pulse: 65 (07/20/18 1005)  Resp: 17 (07/20/18 1005)  BP: (!) 141/67 (07/20/18 1005)  SpO2: 98 % (07/20/18 1005) Vital Signs (24h Range):  Temp:  [97.7 °F (36.5 °C)-99.1 °F (37.3 °C)] 99.1 °F (37.3 °C)  Pulse:  [60-89] 65  Resp:  [11-31] 17  SpO2:  [96 %-100 %] 98 %  BP: (124-199)/(62-93) 141/67  Arterial Line BP: (119-178)/(46-59) 136/47       Date 07/20/18 0700 - 07/21/18 0659   Shift 0244-7187 5686-7984 0347-3190 24 Hour Total   I  N  T  A  K  E   I.V.  (mL/kg) 306.3  (3.9)   306.3  (3.9)    NG/GT 80   80    Shift Total  (mL/kg) 386.3  (4.9)   386.3  (4.9)   O  U  T  P  U  T   Urine  (mL/kg/hr) 225   225    Shift Total  (mL/kg) 225  (2.9)   225  (2.9)   Weight (kg) 78.9 78.9 78.9 78.9                        NG/OG Tube 07/16/18 1200 Left nostril (Active)   Placement Check placement verified by x-ray 7/20/2018  7:05 AM   Distal Tube Length (cm) 70 7/20/2018  7:05 AM   Tolerance no signs/symptoms of discomfort 7/20/2018  7:05 AM   Securement anchored to nostril center w/ adhesive device 7/20/2018  7:05 AM   Clamp Status/Tolerance clamped;no abdominal distention;no emesis;no nausea;no abdominal discomfort;no residual;no restlessness 7/20/2018  7:05 AM   Insertion Site Appearance no redness, warmth, tenderness, skin breakdown, drainage 7/20/2018  7:05 AM   Flush/Irrigation flushed w/;water;no resistance met 7/20/2018  7:05 AM   Feeding Action feeding held 7/19/2018  3:02 PM   Intake (mL) 80 mL 7/20/2018  9:05 AM   Residual Amount (ml) 0 ml 7/19/2018  7:15 PM       Male External  hills 5 min  Eval above  TM walking 2.5 mph 5 min  UBE level 2 hills 4 min forward/retro 2 min each    There ex:   Shoulder circles with serratus punch 2# 10 cw 10 ccw; 3# 10 cw 3 CCW   Sidelying ER 10 reps x 3 sets   Prone scapular retraction 10 reps x 2 2 sets   Chest doorway stretch- deferred *pain                               Manual:   Posterior GHJ glide gr IV 10 reps x 3 sets   Inferior GHJ glide gr IV 10 reps x 3 sets         Pt education: answered all questions and concerns pre and post treatment; Urinary Catheter 07/18/18 1800 Medium (Active)   Collection Container Urimeter 7/20/2018  7:05 AM   Securement Method secured to top of thigh w/ adhesive device 7/20/2018  7:05 AM   Skin no redness;no breakdown 7/20/2018  7:05 AM   Tolerance no signs/symptoms of discomfort 7/20/2018  7:05 AM   Output (mL) 225 mL 7/20/2018  9:05 AM       Neurosurgery Physical Exam  E3V3M5  AOx1, repeats name  R gaze  Does not blink to threat in L eye  Purposeful movements in BUE  Moving RLE spontaneously  Withdraw LLE    Significant Labs:    Recent Labs  Lab 07/19/18  0445 07/20/18  0208   *  247* 239*     140 141   K 4.0  4.0 3.9     103 107   CO2 24  24 21*   BUN 24*  24* 23   CREATININE 0.9  0.9 0.9   CALCIUM 9.5  9.5 9.1   MG 2.2  2.2 2.1       Recent Labs  Lab 07/19/18  0445 07/20/18  0208   WBC 7.11  7.11 9.09   HGB 11.8*  11.8* 11.8*   HCT 35.5*  35.5* 35.7*     271 299     No results for input(s): LABPT, INR, APTT in the last 48 hours.  Microbiology Results (last 7 days)     ** No results found for the last 168 hours. **        Recent Lab Results       07/20/18  0816 07/20/18  0208 07/20/18  0207 07/19/18  2302 07/19/18  2128      Immature Granulocytes  0.3        Immature Grans (Abs)  0.03  Comment:  Mild elevation in immature granulocytes is non specific and   can be seen in a variety of conditions including stress response,   acute inflammation, trauma and pregnancy. Correlation with other   laboratory and clinical findings is essential.          Allens Test    N/A      Anion Gap  13        Baso #  0.03        Basophil%  0.3        Site    Nel/UAC      BUN, Bld  23        Calcium  9.1        Chloride  107        CO2  21(L)        Creatinine  0.9        DelSys    Room Air      Differential Method  Automated        eGFR if   >60.0        eGFR if non   >60.0  Comment:  Calculation used to obtain the estimated glomerular filtration  rate (eGFR) is the  CKD-EPI equation.           Eos #  0.1        Eosinophil%  1.2        FiO2          Glucose  239(H)        Gran # (ANC)  7.0        Gran%  77.5(H)        Hematocrit  35.7(L)        Hemoglobin  11.8(L)        Lymph #  1.1        Lymph%  12.2(L)        Magnesium  2.1        MCH  29.2        MCHC  33.1        MCV  88        Mode    SPONT      Mono #  0.8        Mono%  8.5        MPV  11.2        nRBC  0        Phosphorus  3.3        Platelets  299        POC BE    3      POC HCO3    27.3      POC PCO2    38.0      POC PH    7.464(H)      POC PO2    72(L)      POC SATURATED O2    95      POC TCO2    28(H)      POCT Glucose 238(H)  204(H)  180(H)     Potassium  3.9        Rate    13      RBC  4.04(L)        RDW  12.8        Sample    ARTERIAL      Sodium  141        Sp02    97      WBC  9.09                    07/19/18  1642 07/19/18  1142 07/19/18  1140      Immature Granulocytes        Immature Grans (Abs)        Allens Test  N/A      Anion Gap        Baso #        Basophil%        Site  Other      BUN, Bld        Calcium        Chloride        CO2        Creatinine        DelSys  Room Air      Differential Method        eGFR if         eGFR if non         Eos #        Eosinophil%        FiO2  21      Glucose        Gran # (ANC)        Gran%        Hematocrit        Hemoglobin        Lymph #        Lymph%        Magnesium        MCH        MCHC        MCV        Mode  SPONT      Mono #        Mono%        MPV        nRBC        Phosphorus        Platelets        POC BE  9      POC HCO3  32.7(H)      POC PCO2  48.4(H)      POC PH  7.438      POC PO2  25(LL)      POC SATURATED O2  48(L)      POC TCO2  34(H)      POCT Glucose 243(H)  196(H)     Potassium        Rate  9      RBC        RDW        Sample  VENOUS      Sodium        Sp02  100      WBC              Significant Diagnostics:  I have reviewed all pertinent imaging results/findings within the past 24 hours.    Assessment/Plan:     *  Nontraumatic intraventricular intracerebral hemorrhage    69 yo male with right thalamic hemorrhage (ICH score 2) initially found on 7/9/18 at outside hospital in [place]  - HCT shows acute right thalamic hemorrhage with intraventricular extension.  Ventricular system is enlarged on 7/18 scan, HCT on 11/13/17 shows baseline ventricular enlargement.  There are no films available from recent admission in [place] for comparison   - Continue NCC   -  f/u EEG  - Repeat HCT stable.   - Will follow, please call with questions or any change in neurologic status  - Patient is DNR.             Lin Valles MD  Neurosurgery  [facility]

## 2021-04-21 ENCOUNTER — APPOINTMENT (OUTPATIENT)
Dept: PHYSICAL THERAPY | Age: 41
End: 2021-04-21
Attending: FAMILY MEDICINE
Payer: COMMERCIAL

## 2021-04-22 DIAGNOSIS — E78.1 HYPERTRIGLYCERIDEMIA: ICD-10-CM

## 2021-04-23 RX ORDER — SIMVASTATIN 20 MG
TABLET ORAL
Qty: 90 TABLET | Refills: 0 | Status: SHIPPED | OUTPATIENT
Start: 2021-04-23 | End: 2021-08-03

## 2021-04-27 ENCOUNTER — TELEPHONE (OUTPATIENT)
Dept: PHYSICAL THERAPY | Facility: HOSPITAL | Age: 41
End: 2021-04-27

## 2021-05-04 ENCOUNTER — APPOINTMENT (OUTPATIENT)
Dept: PHYSICAL THERAPY | Age: 41
End: 2021-05-04
Attending: PODIATRIST
Payer: COMMERCIAL

## 2021-05-11 ENCOUNTER — APPOINTMENT (OUTPATIENT)
Dept: PHYSICAL THERAPY | Age: 41
End: 2021-05-11
Attending: FAMILY MEDICINE
Payer: COMMERCIAL

## 2021-05-31 ENCOUNTER — PATIENT MESSAGE (OUTPATIENT)
Dept: FAMILY MEDICINE CLINIC | Facility: CLINIC | Age: 41
End: 2021-05-31

## 2021-05-31 DIAGNOSIS — E53.8 VITAMIN B12 DEFICIENCY: ICD-10-CM

## 2021-05-31 DIAGNOSIS — R73.03 PREDIABETES: ICD-10-CM

## 2021-05-31 DIAGNOSIS — E55.9 VITAMIN D DEFICIENCY: ICD-10-CM

## 2021-05-31 DIAGNOSIS — E78.1 HYPERTRIGLYCERIDEMIA: Primary | ICD-10-CM

## 2021-05-31 DIAGNOSIS — Z00.00 LABORATORY EXAM ORDERED AS PART OF ROUTINE GENERAL MEDICAL EXAMINATION: ICD-10-CM

## 2021-06-01 NOTE — TELEPHONE ENCOUNTER
From: ODILIA [facility]  To: Abner Hawkins MD  Sent: 5/31/2021 11:09 AM CDT  Subject: Referral Request    Hi Dr. Conner West,  Would you please place a lab work order for my annual health check-up?   Thanks,  [employer]

## 2021-06-02 ENCOUNTER — LABORATORY ENCOUNTER (OUTPATIENT)
Dept: LAB | Age: 41
End: 2021-06-02
Attending: FAMILY MEDICINE
Payer: COMMERCIAL

## 2021-06-02 DIAGNOSIS — E53.8 VITAMIN B12 DEFICIENCY: ICD-10-CM

## 2021-06-02 DIAGNOSIS — E55.9 VITAMIN D DEFICIENCY: ICD-10-CM

## 2021-06-02 DIAGNOSIS — R73.03 PREDIABETES: ICD-10-CM

## 2021-06-02 DIAGNOSIS — E78.1 HYPERTRIGLYCERIDEMIA: ICD-10-CM

## 2021-06-02 DIAGNOSIS — Z00.00 LABORATORY EXAM ORDERED AS PART OF ROUTINE GENERAL MEDICAL EXAMINATION: ICD-10-CM

## 2021-06-02 PROCEDURE — 36415 COLL VENOUS BLD VENIPUNCTURE: CPT

## 2021-06-02 PROCEDURE — 82306 VITAMIN D 25 HYDROXY: CPT

## 2021-06-02 PROCEDURE — 80053 COMPREHEN METABOLIC PANEL: CPT

## 2021-06-02 PROCEDURE — 83036 HEMOGLOBIN GLYCOSYLATED A1C: CPT

## 2021-06-02 PROCEDURE — 80061 LIPID PANEL: CPT

## 2021-06-02 PROCEDURE — 82607 VITAMIN B-12: CPT

## 2021-06-02 PROCEDURE — 85025 COMPLETE CBC W/AUTO DIFF WBC: CPT

## 2021-06-02 PROCEDURE — 84443 ASSAY THYROID STIM HORMONE: CPT

## 2021-06-04 ENCOUNTER — OFFICE VISIT (OUTPATIENT)
Dept: FAMILY MEDICINE CLINIC | Facility: CLINIC | Age: 41
End: 2021-06-04

## 2021-06-04 VITALS
WEIGHT: 185.38 LBS | RESPIRATION RATE: 16 BRPM | HEIGHT: 64 IN | SYSTOLIC BLOOD PRESSURE: 114 MMHG | HEART RATE: 86 BPM | OXYGEN SATURATION: 98 % | BODY MASS INDEX: 31.65 KG/M2 | TEMPERATURE: 98 F | DIASTOLIC BLOOD PRESSURE: 70 MMHG

## 2021-06-04 DIAGNOSIS — E53.8 VITAMIN B12 DEFICIENCY: ICD-10-CM

## 2021-06-04 DIAGNOSIS — Z00.00 ANNUAL PHYSICAL EXAM: Primary | ICD-10-CM

## 2021-06-04 DIAGNOSIS — E78.1 HYPERTRIGLYCERIDEMIA: ICD-10-CM

## 2021-06-04 DIAGNOSIS — E55.9 VITAMIN D DEFICIENCY: ICD-10-CM

## 2021-06-04 PROCEDURE — 3008F BODY MASS INDEX DOCD: CPT | Performed by: FAMILY MEDICINE

## 2021-06-04 PROCEDURE — 3074F SYST BP LT 130 MM HG: CPT | Performed by: FAMILY MEDICINE

## 2021-06-04 PROCEDURE — 3078F DIAST BP <80 MM HG: CPT | Performed by: FAMILY MEDICINE

## 2021-06-04 PROCEDURE — 99396 PREV VISIT EST AGE 40-64: CPT | Performed by: FAMILY MEDICINE

## 2021-06-04 NOTE — PROGRESS NOTES
Patient presents with:  Physical: Annual      HPI:   Dao Hernandez is a 42year old male who presents for a complete physical exam.     Last colonoscopy:  N/a - at 47  Last PSA:  N/a - at 49  Immunizations: COVID UTD. TDaP 2017.          HLD 06/02/2021 07:56 AM    TRIG 124 06/02/2021 07:56 AM      No results found for: PSA      Current Outpatient Medications   Medication Sig Dispense Refill   • SIMVASTATIN 20 MG Oral Tab TAKE 1 TABLET(20 MG) BY MOUTH EVERY NIGHT 90 tablet 0   • Vitamin D, Chol mucosa, and tongue normal; teeth and gums normal  Neck: no adenopathy, no JVD, supple, symmetrical, trachea midline and thyroid not enlarged, symmetric, no tenderness/mass/nodules  Lungs: clear to auscultation bilaterally  Heart: S1, S2 normal, no murmur,

## 2021-07-28 DIAGNOSIS — E78.1 HYPERTRIGLYCERIDEMIA: ICD-10-CM

## 2021-08-03 RX ORDER — SIMVASTATIN 20 MG
TABLET ORAL
Qty: 90 TABLET | Refills: 1 | Status: SHIPPED | OUTPATIENT
Start: 2021-08-03

## 2021-08-03 NOTE — TELEPHONE ENCOUNTER
LOV 06/04/2021 enedina/ Constantin Barba for physical  Last LIPID 06/02/21  No future appt scheduled  Pt is pre-diabetic and should be seen every 6 months will refill until that time

## 2021-08-03 NOTE — TELEPHONE ENCOUNTER
SIMVASTATIN 20MG TABLETS     Sig: TAKE 1 TABLET(20 MG) BY MOUTH EVERY NIGHT    Disp:  90 tablet    Refills:  0 (Pharmacy requested: Not specified)    Start: 7/28/2021    Class: Normal    Non-formulary For: Hypertriglyceridemia    Last ordered: 3 months ago

## 2021-09-29 ENCOUNTER — PATIENT MESSAGE (OUTPATIENT)
Dept: FAMILY MEDICINE CLINIC | Facility: CLINIC | Age: 41
End: 2021-09-29

## 2021-09-29 DIAGNOSIS — M75.41 SHOULDER IMPINGEMENT SYNDROME, RIGHT: Primary | ICD-10-CM

## 2021-09-29 NOTE — TELEPHONE ENCOUNTER
From: ODILIA [facility]  To: Bia Osborn MD  Sent: 9/29/2021 8:05 AM CDT  Subject: Shoulder issue    Hello Dr. Mila Polanco,  I have that inflammation-causing pain again in my right shoulder joint.  I would appreciate having:  (1) an anti-inflamma

## 2021-09-29 NOTE — TELEPHONE ENCOUNTER
Pt has been having ongoing issues with R shoulder x several years . Last had PT in March.   Do you want Pt to see a specialist?

## 2021-10-19 ENCOUNTER — TELEPHONE (OUTPATIENT)
Dept: ORTHOPEDICS CLINIC | Facility: CLINIC | Age: 41
End: 2021-10-19

## 2021-10-19 DIAGNOSIS — M25.511 RIGHT SHOULDER PAIN, UNSPECIFIED CHRONICITY: Primary | ICD-10-CM

## 2021-10-19 NOTE — TELEPHONE ENCOUNTER
Patient coming in for Right shoulder pain. Patient has not had imaging, if imaging needed please place Rx.   Future Appointments   Date Time Provider Quin Zuri   10/27/2021 11:00 AM Shruti Browning MD [id number]

## 2021-10-19 NOTE — TELEPHONE ENCOUNTER
• Reviewed patients chart, xray orders are required. Order placed for right shoulder xrays  • Please contact patient advise to arrive 30 mins prior to patients appt to complete x-ray order.    • Please schedule patients xray appt-Thank you

## 2021-10-27 ENCOUNTER — HOSPITAL ENCOUNTER (OUTPATIENT)
Dept: GENERAL RADIOLOGY | Age: 41
Discharge: HOME | End: 2021-10-27
Attending: ORTHOPAEDIC SURGERY
Payer: COMMERCIAL

## 2021-10-27 ENCOUNTER — OFFICE VISIT (OUTPATIENT)
Dept: ORTHOPEDICS CLINIC | Facility: CLINIC | Age: 41
End: 2021-10-27

## 2021-10-27 VITALS — WEIGHT: 170 LBS | BODY MASS INDEX: 29.02 KG/M2 | HEIGHT: 64 IN

## 2021-10-27 DIAGNOSIS — M25.511 RIGHT SHOULDER PAIN, UNSPECIFIED CHRONICITY: ICD-10-CM

## 2021-10-27 DIAGNOSIS — M75.41 IMPINGEMENT SYNDROME OF RIGHT SHOULDER: Primary | ICD-10-CM

## 2021-10-27 PROCEDURE — 99243 OFF/OP CNSLTJ NEW/EST LOW 30: CPT | Performed by: ORTHOPAEDIC SURGERY

## 2021-10-27 PROCEDURE — 3008F BODY MASS INDEX DOCD: CPT | Performed by: ORTHOPAEDIC SURGERY

## 2021-10-27 PROCEDURE — 73030 X-RAY EXAM OF SHOULDER: CPT | Performed by: ORTHOPAEDIC SURGERY

## 2021-10-27 NOTE — PROGRESS NOTES
[facility] New Consult    CC: Patient presents with:  Shoulder Pain: Pt is here for shoulder pain for years. Pain is getting worse as of late.       HPI: The patient is a 42year old male referred for orthopaedic consultation by Dr. Eugenia Baptiste use: No        Alcohol/week: 0.0 standard drinks      Drug use: No      Sexual activity: Not on file       ROS:  Complete ROS reviewed by me and non-contributory to the chief complaint except as mentioned above.     Physical Exam:    Ht 5' 4\" (1.626 m)   W cuff strengthening, followed by a home program.  Activity modification, anti-inflammatory use and the prescribed physical therapy regimen should be effective over the course of 6 to 8 weeks.   If symptoms persist, the patient was asked to follow-up for reas

## 2021-11-30 ENCOUNTER — TELEPHONE (OUTPATIENT)
Dept: PHYSICAL THERAPY | Facility: HOSPITAL | Age: 41
End: 2021-11-30

## 2021-12-02 ENCOUNTER — OFFICE VISIT (OUTPATIENT)
Dept: PHYSICAL THERAPY | Age: 41
End: 2021-12-02
Attending: ORTHOPAEDIC SURGERY
Payer: COMMERCIAL

## 2021-12-02 DIAGNOSIS — M75.41 IMPINGEMENT SYNDROME OF RIGHT SHOULDER: ICD-10-CM

## 2021-12-02 PROCEDURE — 97161 PT EVAL LOW COMPLEX 20 MIN: CPT

## 2021-12-02 PROCEDURE — 97110 THERAPEUTIC EXERCISES: CPT

## 2021-12-02 NOTE — PROGRESS NOTES
SHOULDER EVALUATION:   Referring Physician: Dr. Carmine Acevedo  Diagnosis: R shoulder rotator cuff tendonitis subscapularis; subacromial impingement      Date of Service: 12/2/2021     PATIENT SUMMARY   Lance Rooney is a 42year old male who prese following: popping, locking, giving way. Admitting: clicking with some exercises; catching- chief co    Physician Appointment:Dr Jp Easton- only as needed    Imaging: xray: No acute process. Lateral downsloping of the acromion.  This is an anatomic variation, of AROM: (* denotes performed with pain)  Shoulder  Elbow   Flexion: R 180 deg; L: 180 deg  Abduction: R 180 deg; L 180 deg  90/90 ER: R: 110 deg; L: 110 deg  90/90; IR: R: 85; L: 80 deg   IR: R T8; L T6 FULL        Accessory motion: GHJ unremarkable post 12/02/2021  Prepared by: Genevieve Redd    Exercises  Prone Shoulder Horizontal Abduction with Thumbs Up - 1 x daily - 7 x weekly - 3 sets - 10 reps  Prone Scapular Slide with Shoulder Extension - 1 x daily - 7 x weekly - 3 sets - 10 reps  Shoulder Arnaldo Klever PT  [de-identified] certification required: Yes  I certify the need for these services furnished under this plan of treatment and while under my care.     X___________________________________________________ Date____________________    Certification From: 61/2/

## 2021-12-07 ENCOUNTER — TELEPHONE (OUTPATIENT)
Dept: PHYSICAL THERAPY | Facility: HOSPITAL | Age: 41
End: 2021-12-07

## 2021-12-07 ENCOUNTER — APPOINTMENT (OUTPATIENT)
Dept: PHYSICAL THERAPY | Age: 41
End: 2021-12-07
Attending: ORTHOPAEDIC SURGERY
Payer: COMMERCIAL

## 2021-12-13 ENCOUNTER — LAB ENCOUNTER (OUTPATIENT)
Dept: LAB | Age: 41
End: 2021-12-13
Attending: FAMILY MEDICINE
Payer: COMMERCIAL

## 2021-12-13 DIAGNOSIS — R53.83 FATIGUE, UNSPECIFIED TYPE: ICD-10-CM

## 2021-12-13 DIAGNOSIS — R73.01 ELEVATED FASTING GLUCOSE: ICD-10-CM

## 2021-12-13 PROCEDURE — 36415 COLL VENOUS BLD VENIPUNCTURE: CPT

## 2021-12-13 PROCEDURE — 80053 COMPREHEN METABOLIC PANEL: CPT

## 2021-12-13 PROCEDURE — 84443 ASSAY THYROID STIM HORMONE: CPT

## 2021-12-13 PROCEDURE — 83036 HEMOGLOBIN GLYCOSYLATED A1C: CPT

## 2021-12-13 PROCEDURE — 82607 VITAMIN B-12: CPT

## 2021-12-14 ENCOUNTER — APPOINTMENT (OUTPATIENT)
Dept: PHYSICAL THERAPY | Age: 41
End: 2021-12-14
Attending: ORTHOPAEDIC SURGERY
Payer: COMMERCIAL

## 2021-12-16 ENCOUNTER — APPOINTMENT (OUTPATIENT)
Dept: PHYSICAL THERAPY | Age: 41
End: 2021-12-16
Attending: ORTHOPAEDIC SURGERY
Payer: COMMERCIAL

## 2021-12-21 ENCOUNTER — APPOINTMENT (OUTPATIENT)
Dept: PHYSICAL THERAPY | Age: 41
End: 2021-12-21
Attending: ORTHOPAEDIC SURGERY
Payer: COMMERCIAL

## 2021-12-23 ENCOUNTER — APPOINTMENT (OUTPATIENT)
Dept: PHYSICAL THERAPY | Age: 41
End: 2021-12-23
Attending: ORTHOPAEDIC SURGERY
Payer: COMMERCIAL

## 2021-12-27 ENCOUNTER — APPOINTMENT (OUTPATIENT)
Dept: PHYSICAL THERAPY | Age: 41
End: 2021-12-27
Attending: ORTHOPAEDIC SURGERY
Payer: COMMERCIAL

## 2021-12-29 ENCOUNTER — APPOINTMENT (OUTPATIENT)
Dept: PHYSICAL THERAPY | Age: 41
End: 2021-12-29
Attending: ORTHOPAEDIC SURGERY
Payer: COMMERCIAL

## 2022-01-18 ENCOUNTER — HOSPITAL ENCOUNTER (OUTPATIENT)
Age: 42
Discharge: HOME | End: 2022-01-18
Attending: EMERGENCY MEDICINE
Payer: COMMERCIAL

## 2022-01-18 VITALS
BODY MASS INDEX: 28 KG/M2 | RESPIRATION RATE: 16 BRPM | TEMPERATURE: 98 F | WEIGHT: 165 LBS | DIASTOLIC BLOOD PRESSURE: 61 MMHG | SYSTOLIC BLOOD PRESSURE: 102 MMHG | HEART RATE: 74 BPM | OXYGEN SATURATION: 97 %

## 2022-01-18 DIAGNOSIS — J02.9 ACUTE VIRAL PHARYNGITIS: Primary | ICD-10-CM

## 2022-01-18 LAB — S PYO AG THROAT QL: NEGATIVE

## 2022-01-18 PROCEDURE — 87081 CULTURE SCREEN ONLY: CPT | Performed by: EMERGENCY MEDICINE

## 2022-01-18 PROCEDURE — 87880 STREP A ASSAY W/OPTIC: CPT

## 2022-01-18 PROCEDURE — 99213 OFFICE O/P EST LOW 20 MIN: CPT

## 2022-01-18 PROCEDURE — 99214 OFFICE O/P EST MOD 30 MIN: CPT

## 2022-01-19 NOTE — ED PROVIDER NOTES
Patient Seen in: [facility]      History   Patient presents with:  Sore Throat    Stated Complaint: Sore Throat    Subjective:   HPI    13-year-old with a history of high cholesterol presents for evaluation of sore throat  sore throat sta Breath sounds clear bilaterally  Abdomen: nondistended.   Skin: warm and dry, no diaphoresis  Neuro: Speech is normal. Patient is alert and answers questions appropriately    ED Course     Labs Reviewed   POCT RAPID STREP - Normal                   MDM

## 2022-02-15 ENCOUNTER — PATIENT MESSAGE (OUTPATIENT)
Dept: FAMILY MEDICINE CLINIC | Facility: CLINIC | Age: 42
End: 2022-02-15

## 2022-02-16 ENCOUNTER — PATIENT MESSAGE (OUTPATIENT)
Dept: FAMILY MEDICINE CLINIC | Facility: CLINIC | Age: 42
End: 2022-02-16

## 2022-02-16 NOTE — TELEPHONE ENCOUNTER
"Nutrition/ Diabetes  Progress Report    The patient was seen for 60 minutes RD for diabetes self-management training, including nutrition and diabetes education in an individual setting. RD Comments:  Arianna Guzman actively participated in discussion. Arianna Guzman stated he is here today to review carbohydrate counting as end goal is to get a pump after the first of the year. He stated he has been using an dennis on his phone but it is not logging everything her wants to track. Did bring in food log-however- he just listed food items, no amounts or carb grams. He has been reading food labels and using plate method to limit portions/check carbs. During APNP last visit discussed and agreed on 50-60 grams of carbs per meals. Reviewed current diet and lifestyle habits. Diet recall shows patient appears deficient in fruit,vegetables, whole grains, lean meats, healthy fats with excessive consumption of high fat, fast food. Meal/snack timing is inappropriate. Educated on 1 carb choices = 15 grams of carbohydrate. Discussed using a phone dennis he likes-list provided, Calorie [employer]- he was under the impression that this web site required storing your data ( explained how it works) and carb booklet. Used his food log to practice carb counting. After review he was able to understand need to know amounts and grams of carbohydrates in each food, ""thank you this helped make a light bulb go off, I get it now. \"" Encouraged him to do another food log with putting down food, amount and grams of carbohydrates. Discussed 50-60 grams per meal now, in order to develop ICR. Showed how some of his meals are >100 grams of carbs by selection of food types, other meals <30 grams. Provided education on basics of human metabolism and the influences of each food group, meal/snack timing and size of intakes have on health. Explained what foods contain CHO, protein and fat. Stressed importance of consuming consistently throughout the day.   Educated " From: Alex Santos  To: [facility] Rocio Cedeno  Sent: 2/16/2022 3:44 PM CST  Subject: Dr. Ophelia Winston M.D. Hi Michelle:    Yes it looks like Dr. Ophelia Winston M.D. is out of [facility] so he is out of network. Do you want to do a little more research to see who else you might like to go see? [place] [facility] is in network, [facility] is in network, [address] dermatology is in network, [facility] dermatology is in network. I hope this helps. Let me know what you decide. I think the insurance site is down right now and that is why I can't confirm your insurance.     Gisel Parra, [address] [place] [address]  Office of Dr. Gallo Fink M.D. patient on food group portion sizes and label reading for carbohydrate, fiber, fat and sodium content. Encouraged patient to increase fiber consumption to help regulate blood sugar levels and hunger. Utilized food models and measuring cups to demonstrate portion control. Verbalized understanding. Referral Diagnosis:  Type 2 diabetes mellitus without complication [J41.2]    Barriers and Readiness to Learning: The instruction was given to Alli Ortiz  Barriers to self-care and learning limitations:none      Readiness to learn: The patient demonstrates the ability to understand and asks questions. Learning needs were assessed and the patient requires education in diabetes disease process/treatment options, medical nutrition therapy, physical acitivity, blood glucose monitoring, diabetes medications, acute complications, chronic complications, diabetes psychosocial adjustment and strategies and goal setting. Material was presented using verbal, written, demonstration and return demonstration. Assessment:  Food and Nutrition Related History:   Oral fluids: water or diet coke  Type of food/meals:   Breakfast: skips at times or eats out at [employer]: skips at times or will eat a salad  Dinner: mac n cheese, salad with duane  Snack: vending machine items or fruit  Alcohol Frequency: wine cooler on occasion    Current diabetes medications: Levemir 78 units nightly and Novolog 22 units + ISF 1:30 target 130    Anthropometric Measurements:  Weight: 83 kg      Biochemical Data, Medical Tests, and Procedures:  Hemoglobin A1C (%)   Date Value   04/20/2018 9.0 (H)      CHOLESTEROL (mg/dL)   Date Value   07/11/2018 181     HDL (mg/dL)   Date Value   07/11/2018 42   No components found for: LDLNo components found for: TRIGLYDERIDES     FBS ranges at home: .  Other BS readings 105-356    Nutrition Diagnosis:  Food and nutrition-related knowledge deficit related to limited meal planning ability, as evidenced by difficulty identifying appropriate food portions and using food labels. Inappropriate intake of fats related to knowledge deficit of healthiest fat sources as evidenced by patient's daily food/beverage choices and/or lipid levels. Overweight/Obesity related to history of excessive calorie intake and physical inactivity as evidenced by BMI. .    Nutrition Prescription:  Meal plan with  50-60 grams of carbohydrates per meal- per Humberto TILLEY Endo recommendation    Teaching was provided on the following:   Medical Nutrition Therapy  Monitoring and Evaluation:  Understanding of personalized meal plan   Verbal, Written and Demonstrated  Needs review     Effect of timing, amount and type of carbohydrate on blood glucose level  Verbal, Written and Demonstrated   Needs review    Understanding of nutrition labels in meal planning  Verbal and Written  Competent     Healthy dining out practices  Verbal and Written  Needs review    Nutrition strategies for lipid, hypertension management  Needs instruction    Sodium in your diet  Needs instruction    Fiber in your diet  Verbal and Written  Needs review    Physical Activity  Physical Exercise - Effects of physical activity on blood sugar control, health benefits. Reviewed the benefits of physical activity and encouraged 150 minutes of physical activity per week. Continue current exercise plan  Needs reinforcement    Needs review         Print/Written Resources Provided:   Carbo Counting and meal planning booklet,  Diabetes Resource sheet, food logs and My Meal Plan    Plan to evaluate:  Glucose, fasting: <=120  HgbA1c: <=7.0. Goal Setting to Promote Health & Problem Solving for Daily Living was discussed. The patient's ongoing diabetes self-management support plan includes:  Diabetes educator contact number/business card    Recommended Follow-up:  Follow-up appointment to call to schedule.  Provided Diabetes Education Departmentâs contact number/business card and encouraged to call back if any questions or concerns. Wes Celis will complete a food log for review with food, amount and grams of carbohydrates. Thank you for your referral.  Please contact me with any question/concerns. The MD referral is located in EMR.       Jo Conde RD, CD  Diabetes Education Department

## 2022-02-16 NOTE — TELEPHONE ENCOUNTER
From: [facility]  To: Angelo Arce MD  Sent: 2/15/2022 8:55 PM CST  Subject: Scalp white batch    Mamadoulo Dr. Peter Gregorio,  The white batch I used to have in my scalp has become worse than ever as it is getting bigger and too etchy. I did some research and for dermatologists and see good reviews for Dr. Keith Saenz. Is it possible to have a referral for me to see him?   Thank you,  [facility]

## 2022-02-18 ENCOUNTER — PATIENT MESSAGE (OUTPATIENT)
Dept: FAMILY MEDICINE CLINIC | Facility: CLINIC | Age: 42
End: 2022-02-18

## 2022-02-19 NOTE — TELEPHONE ENCOUNTER
From: Chelsey Witt  Sent: 2/18/2022 5:12 PM CST  To: [facility] Clinical Staff  Subject: dermatology    Hi,  I have seen Dr. Amber Weber before and did not help. Can we go for Dr. Karie Emery?   Thanks, [employer]

## 2022-04-07 ENCOUNTER — HOSPITAL ENCOUNTER (OUTPATIENT)
Age: 42
Discharge: HOME | End: 2022-04-07
Attending: EMERGENCY MEDICINE
Payer: COMMERCIAL

## 2022-04-07 ENCOUNTER — APPOINTMENT (OUTPATIENT)
Dept: GENERAL RADIOLOGY | Age: 42
End: 2022-04-07
Attending: EMERGENCY MEDICINE
Payer: COMMERCIAL

## 2022-04-07 VITALS
DIASTOLIC BLOOD PRESSURE: 76 MMHG | HEIGHT: 64 IN | RESPIRATION RATE: 14 BRPM | SYSTOLIC BLOOD PRESSURE: 108 MMHG | OXYGEN SATURATION: 98 % | WEIGHT: 165 LBS | TEMPERATURE: 97 F | BODY MASS INDEX: 28.17 KG/M2 | HEART RATE: 65 BPM

## 2022-04-07 DIAGNOSIS — R07.89 CHEST WALL PAIN: Primary | ICD-10-CM

## 2022-04-07 LAB
#MXD IC: 0.5 X10ˆ3/UL (ref 0.1–1)
ATRIAL RATE: 60 BPM
BUN BLD-MCNC: 18 MG/DL (ref 7–18)
CHLORIDE BLD-SCNC: 102 MMOL/L (ref 98–112)
CO2 BLD-SCNC: 27 MMOL/L (ref 21–32)
CREAT BLD-MCNC: 0.9 MG/DL
DDIMER WHOLE BLOOD: <200 NG/ML DDU (ref ?–400)
GLUCOSE BLD-MCNC: 87 MG/DL (ref 70–99)
HCT VFR BLD AUTO: 47.3 %
HCT VFR BLD CALC: 46 %
HGB BLD-MCNC: 14.9 G/DL
ISTAT IONIZED CALCIUM FOR CHEM 8: 1.25 MMOL/L (ref 1.12–1.32)
LYMPHOCYTES # BLD AUTO: 2.8 X10ˆ3/UL (ref 1–4)
LYMPHOCYTES NFR BLD AUTO: 48.2 %
MCH RBC QN AUTO: 26 PG (ref 26–34)
MCHC RBC AUTO-ENTMCNC: 31.5 G/DL (ref 31–37)
MCV RBC AUTO: 82.7 FL (ref 80–100)
MIXED CELL %: 8.1 %
NEUTROPHILS # BLD AUTO: 2.5 X10ˆ3/UL (ref 1.5–7.7)
NEUTROPHILS NFR BLD AUTO: 43.7 %
P AXIS: 26 DEGREES
P-R INTERVAL: 168 MS
PLATELET # BLD AUTO: 224 X10ˆ3/UL (ref 150–450)
POTASSIUM BLD-SCNC: 3.8 MMOL/L (ref 3.6–5.1)
Q-T INTERVAL: 414 MS
QRS DURATION: 94 MS
QTC CALCULATION (BEZET): 414 MS
R AXIS: 49 DEGREES
RBC # BLD AUTO: 5.72 X10ˆ6/UL
SODIUM BLD-SCNC: 142 MMOL/L (ref 136–145)
T AXIS: 21 DEGREES
TROPONIN I BLD-MCNC: <0.02 NG/ML
VENTRICULAR RATE: 60 BPM
WBC # BLD AUTO: 5.8 X10ˆ3/UL (ref 4–11)

## 2022-04-07 PROCEDURE — 96374 THER/PROPH/DIAG INJ IV PUSH: CPT

## 2022-04-07 PROCEDURE — 71046 X-RAY EXAM CHEST 2 VIEWS: CPT | Performed by: EMERGENCY MEDICINE

## 2022-04-07 PROCEDURE — 99214 OFFICE O/P EST MOD 30 MIN: CPT

## 2022-04-07 PROCEDURE — 99215 OFFICE O/P EST HI 40 MIN: CPT

## 2022-04-07 PROCEDURE — 80047 BASIC METABLC PNL IONIZED CA: CPT

## 2022-04-07 PROCEDURE — 85378 FIBRIN DEGRADE SEMIQUANT: CPT | Performed by: EMERGENCY MEDICINE

## 2022-04-07 PROCEDURE — 84484 ASSAY OF TROPONIN QUANT: CPT

## 2022-04-07 PROCEDURE — 85025 COMPLETE CBC W/AUTO DIFF WBC: CPT | Performed by: EMERGENCY MEDICINE

## 2022-04-07 PROCEDURE — 93010 ELECTROCARDIOGRAM REPORT: CPT

## 2022-04-07 PROCEDURE — 93005 ELECTROCARDIOGRAM TRACING: CPT

## 2022-04-07 RX ORDER — KETOROLAC TROMETHAMINE 30 MG/ML
15 INJECTION, SOLUTION INTRAMUSCULAR; INTRAVENOUS ONCE
Status: COMPLETED | OUTPATIENT
Start: 2022-04-07 | End: 2022-04-07

## 2022-04-07 RX ORDER — CYCLOBENZAPRINE HCL 10 MG
10 TABLET ORAL 3 TIMES DAILY PRN
Qty: 20 TABLET | Refills: 0 | Status: SHIPPED | OUTPATIENT
Start: 2022-04-07 | End: 2022-04-14

## 2022-04-14 ENCOUNTER — OFFICE VISIT (OUTPATIENT)
Dept: FAMILY MEDICINE CLINIC | Facility: CLINIC | Age: 42
End: 2022-04-14
Payer: COMMERCIAL

## 2022-04-14 VITALS
WEIGHT: 171 LBS | SYSTOLIC BLOOD PRESSURE: 110 MMHG | RESPIRATION RATE: 16 BRPM | HEART RATE: 64 BPM | DIASTOLIC BLOOD PRESSURE: 78 MMHG | TEMPERATURE: 97 F | HEIGHT: 64 IN | BODY MASS INDEX: 29.19 KG/M2

## 2022-04-14 DIAGNOSIS — R07.89 LEFT-SIDED CHEST WALL PAIN: Primary | ICD-10-CM

## 2022-04-14 PROCEDURE — 3074F SYST BP LT 130 MM HG: CPT | Performed by: PHYSICIAN ASSISTANT

## 2022-04-14 PROCEDURE — 3078F DIAST BP <80 MM HG: CPT | Performed by: PHYSICIAN ASSISTANT

## 2022-04-14 PROCEDURE — 99214 OFFICE O/P EST MOD 30 MIN: CPT | Performed by: PHYSICIAN ASSISTANT

## 2022-04-14 PROCEDURE — 3008F BODY MASS INDEX DOCD: CPT | Performed by: PHYSICIAN ASSISTANT

## 2022-04-14 RX ORDER — PREDNISONE 20 MG/1
40 TABLET ORAL DAILY
Qty: 10 TABLET | Refills: 0 | Status: SHIPPED | OUTPATIENT
Start: 2022-04-14 | End: 2022-04-19

## 2022-04-14 RX ORDER — KETOCONAZOLE 20 MG/ML
SHAMPOO TOPICAL
COMMUNITY
Start: 2022-03-15

## 2022-04-14 RX ORDER — BETAMETHASONE DIPROPIONATE 0.5 MG/G
CREAM TOPICAL
COMMUNITY
Start: 2022-03-15

## 2022-04-19 RX ORDER — SIMVASTATIN 20 MG
TABLET ORAL
Qty: 90 TABLET | Refills: 1 | Status: SHIPPED | OUTPATIENT
Start: 2022-04-19

## 2022-10-12 ENCOUNTER — TELEPHONE (OUTPATIENT)
Dept: FAMILY MEDICINE CLINIC | Facility: CLINIC | Age: 42
End: 2022-10-12

## 2022-10-12 DIAGNOSIS — E78.1 HYPERTRIGLYCERIDEMIA: ICD-10-CM

## 2022-10-12 DIAGNOSIS — R73.01 ELEVATED FASTING GLUCOSE: Primary | ICD-10-CM

## 2022-10-12 DIAGNOSIS — E53.8 VITAMIN B12 DEFICIENCY: ICD-10-CM

## 2022-10-12 DIAGNOSIS — E55.9 VITAMIN D DEFICIENCY: ICD-10-CM

## 2022-10-12 NOTE — TELEPHONE ENCOUNTER
Please enter lab orders for the patient's upcoming physical appointment. Physical scheduled: Your appointments     Date & Time Appointment Department [facility])    Nov 30, 2022  3:30 PM CST Adult Physical with Quinton Wilkins MD [facility], [address], [place]  ([address])    PLEASE NOTE - Most insurances allow a Complete Physical once every 366 days. Please schedule accordingly. Please arrive 15 minutes prior to your scheduled appointment. Please also bring your Insurance card, Photo ID, and your medication bottles or a list of your current medication. If you no longer require this appointment, please contact your physician office to cancel. Sherie [address] Dr Stafford [address] [place] [address] [phone]         [facility]     The patient has been notified to complete fasting labs prior to their physical appointment.

## 2022-11-03 ENCOUNTER — PATIENT MESSAGE (OUTPATIENT)
Dept: FAMILY MEDICINE CLINIC | Facility: CLINIC | Age: 42
End: 2022-11-03

## 2022-11-07 NOTE — TELEPHONE ENCOUNTER
Pt said he called the insurance company and it is a new [employer] and he said that Kailee Valerio said we are in network for them and Dr. Lisa Holliday is his assigned PCP.

## 2022-11-18 ENCOUNTER — OFFICE VISIT (OUTPATIENT)
Dept: FAMILY MEDICINE CLINIC | Facility: CLINIC | Age: 42
End: 2022-11-18
Payer: COMMERCIAL

## 2022-11-18 VITALS
SYSTOLIC BLOOD PRESSURE: 116 MMHG | DIASTOLIC BLOOD PRESSURE: 74 MMHG | TEMPERATURE: 97 F | RESPIRATION RATE: 16 BRPM | HEART RATE: 82 BPM | HEIGHT: 64.75 IN | WEIGHT: 174.19 LBS | BODY MASS INDEX: 29.38 KG/M2

## 2022-11-18 DIAGNOSIS — G44.019 EPISODIC CLUSTER HEADACHE, NOT INTRACTABLE: Primary | ICD-10-CM

## 2022-11-18 PROCEDURE — 3074F SYST BP LT 130 MM HG: CPT | Performed by: STUDENT IN AN ORGANIZED HEALTH CARE EDUCATION/TRAINING PROGRAM

## 2022-11-18 PROCEDURE — 3008F BODY MASS INDEX DOCD: CPT | Performed by: STUDENT IN AN ORGANIZED HEALTH CARE EDUCATION/TRAINING PROGRAM

## 2022-11-18 PROCEDURE — 3078F DIAST BP <80 MM HG: CPT | Performed by: STUDENT IN AN ORGANIZED HEALTH CARE EDUCATION/TRAINING PROGRAM

## 2022-11-18 PROCEDURE — 99214 OFFICE O/P EST MOD 30 MIN: CPT | Performed by: STUDENT IN AN ORGANIZED HEALTH CARE EDUCATION/TRAINING PROGRAM

## 2022-11-18 RX ORDER — SUMATRIPTAN 20 MG/1
SPRAY NASAL
Qty: 1 EACH | Refills: 0 | Status: SHIPPED | OUTPATIENT
Start: 2022-11-18

## 2022-11-21 ENCOUNTER — LABORATORY ENCOUNTER (OUTPATIENT)
Dept: LAB | Age: 42
End: 2022-11-21
Attending: FAMILY MEDICINE
Payer: COMMERCIAL

## 2022-11-21 DIAGNOSIS — E53.8 VITAMIN B12 DEFICIENCY: ICD-10-CM

## 2022-11-21 DIAGNOSIS — E55.9 VITAMIN D DEFICIENCY: ICD-10-CM

## 2022-11-21 DIAGNOSIS — E78.1 HYPERTRIGLYCERIDEMIA: ICD-10-CM

## 2022-11-21 DIAGNOSIS — R73.01 ELEVATED FASTING GLUCOSE: ICD-10-CM

## 2022-11-21 LAB
ALBUMIN SERPL-MCNC: 3.9 G/DL (ref 3.4–5)
ALBUMIN/GLOB SERPL: 1.3 {RATIO} (ref 1–2)
ALP LIVER SERPL-CCNC: 81 U/L
ALT SERPL-CCNC: 69 U/L
ANION GAP SERPL CALC-SCNC: 4 MMOL/L (ref 0–18)
AST SERPL-CCNC: 25 U/L (ref 15–37)
BILIRUB SERPL-MCNC: 0.2 MG/DL (ref 0.1–2)
BUN BLD-MCNC: 21 MG/DL (ref 7–18)
CALCIUM BLD-MCNC: 9 MG/DL (ref 8.5–10.1)
CHLORIDE SERPL-SCNC: 108 MMOL/L (ref 98–112)
CHOLEST SERPL-MCNC: 124 MG/DL (ref ?–200)
CO2 SERPL-SCNC: 29 MMOL/L (ref 21–32)
CREAT BLD-MCNC: 0.97 MG/DL
EST. AVERAGE GLUCOSE BLD GHB EST-MCNC: 120 MG/DL (ref 68–126)
FASTING PATIENT LIPID ANSWER: YES
FASTING STATUS PATIENT QL REPORTED: YES
GFR SERPLBLD BASED ON 1.73 SQ M-ARVRAT: 99 ML/MIN/1.73M2 (ref 60–?)
GLOBULIN PLAS-MCNC: 3 G/DL (ref 2.8–4.4)
GLUCOSE BLD-MCNC: 106 MG/DL (ref 70–99)
HBA1C MFR BLD: 5.8 % (ref ?–5.7)
HDLC SERPL-MCNC: 35 MG/DL (ref 40–59)
LDLC SERPL CALC-MCNC: 70 MG/DL (ref ?–100)
NONHDLC SERPL-MCNC: 89 MG/DL (ref ?–130)
OSMOLALITY SERPL CALC.SUM OF ELEC: 295 MOSM/KG (ref 275–295)
POTASSIUM SERPL-SCNC: 4.2 MMOL/L (ref 3.5–5.1)
PROT SERPL-MCNC: 6.9 G/DL (ref 6.4–8.2)
SODIUM SERPL-SCNC: 141 MMOL/L (ref 136–145)
TRIGL SERPL-MCNC: 98 MG/DL (ref 30–149)
VIT B12 SERPL-MCNC: 231 PG/ML (ref 193–986)
VIT D+METAB SERPL-MCNC: 50.3 NG/ML (ref 30–100)
VLDLC SERPL CALC-MCNC: 15 MG/DL (ref 0–30)

## 2022-11-21 PROCEDURE — 82607 VITAMIN B-12: CPT

## 2022-11-21 PROCEDURE — 83036 HEMOGLOBIN GLYCOSYLATED A1C: CPT

## 2022-11-21 PROCEDURE — 82306 VITAMIN D 25 HYDROXY: CPT

## 2022-11-21 PROCEDURE — 80053 COMPREHEN METABOLIC PANEL: CPT

## 2022-11-21 PROCEDURE — 80061 LIPID PANEL: CPT

## 2022-11-21 PROCEDURE — 36415 COLL VENOUS BLD VENIPUNCTURE: CPT

## 2022-11-30 ENCOUNTER — OFFICE VISIT (OUTPATIENT)
Dept: FAMILY MEDICINE CLINIC | Facility: CLINIC | Age: 42
End: 2022-11-30
Payer: COMMERCIAL

## 2022-11-30 VITALS
TEMPERATURE: 98 F | BODY MASS INDEX: 30.05 KG/M2 | DIASTOLIC BLOOD PRESSURE: 70 MMHG | OXYGEN SATURATION: 98 % | WEIGHT: 176 LBS | HEART RATE: 74 BPM | HEIGHT: 64 IN | SYSTOLIC BLOOD PRESSURE: 104 MMHG | RESPIRATION RATE: 16 BRPM

## 2022-11-30 DIAGNOSIS — E78.1 HYPERTRIGLYCERIDEMIA: ICD-10-CM

## 2022-11-30 DIAGNOSIS — F69 BEHAVIOR CONCERN IN ADULT: ICD-10-CM

## 2022-11-30 DIAGNOSIS — R73.01 ELEVATED FASTING GLUCOSE: ICD-10-CM

## 2022-11-30 DIAGNOSIS — R74.01 ELEVATED ALT MEASUREMENT: ICD-10-CM

## 2022-11-30 DIAGNOSIS — Z00.00 ANNUAL PHYSICAL EXAM: Primary | ICD-10-CM

## 2022-11-30 PROCEDURE — 3078F DIAST BP <80 MM HG: CPT | Performed by: FAMILY MEDICINE

## 2022-11-30 PROCEDURE — 99396 PREV VISIT EST AGE 40-64: CPT | Performed by: FAMILY MEDICINE

## 2022-11-30 PROCEDURE — 3074F SYST BP LT 130 MM HG: CPT | Performed by: FAMILY MEDICINE

## 2022-11-30 PROCEDURE — 3008F BODY MASS INDEX DOCD: CPT | Performed by: FAMILY MEDICINE

## 2022-11-30 RX ORDER — MELATONIN 10 MG
CAPSULE ORAL
COMMUNITY

## 2022-12-30 DIAGNOSIS — E78.1 HYPERTRIGLYCERIDEMIA: ICD-10-CM

## 2022-12-30 RX ORDER — SIMVASTATIN 20 MG
TABLET ORAL
Qty: 90 TABLET | Refills: 1 | Status: SHIPPED | OUTPATIENT
Start: 2022-12-30

## 2023-03-16 ENCOUNTER — HOSPITAL ENCOUNTER (OUTPATIENT)
Age: 43
Discharge: HOME | End: 2023-03-16
Payer: COMMERCIAL

## 2023-03-16 VITALS
RESPIRATION RATE: 16 BRPM | OXYGEN SATURATION: 100 % | BODY MASS INDEX: 31.54 KG/M2 | DIASTOLIC BLOOD PRESSURE: 81 MMHG | HEART RATE: 80 BPM | WEIGHT: 178 LBS | HEIGHT: 63 IN | SYSTOLIC BLOOD PRESSURE: 112 MMHG | TEMPERATURE: 97 F

## 2023-03-16 DIAGNOSIS — G43.811 OTHER MIGRAINE WITH STATUS MIGRAINOSUS, INTRACTABLE: Primary | ICD-10-CM

## 2023-03-16 LAB — SARS-COV-2 RNA RESP QL NAA+PROBE: NOT DETECTED

## 2023-03-16 PROCEDURE — 96375 TX/PRO/DX INJ NEW DRUG ADDON: CPT

## 2023-03-16 PROCEDURE — 96361 HYDRATE IV INFUSION ADD-ON: CPT

## 2023-03-16 PROCEDURE — 96374 THER/PROPH/DIAG INJ IV PUSH: CPT

## 2023-03-16 PROCEDURE — 99214 OFFICE O/P EST MOD 30 MIN: CPT

## 2023-03-16 RX ORDER — KETOROLAC TROMETHAMINE 30 MG/ML
15 INJECTION, SOLUTION INTRAMUSCULAR; INTRAVENOUS ONCE
Status: COMPLETED | OUTPATIENT
Start: 2023-03-16 | End: 2023-03-16

## 2023-03-16 RX ORDER — METOCLOPRAMIDE HYDROCHLORIDE 5 MG/ML
10 INJECTION INTRAMUSCULAR; INTRAVENOUS ONCE
Status: COMPLETED | OUTPATIENT
Start: 2023-03-16 | End: 2023-03-16

## 2023-03-16 RX ORDER — SODIUM CHLORIDE 9 MG/ML
1000 INJECTION, SOLUTION INTRAVENOUS ONCE
Status: COMPLETED | OUTPATIENT
Start: 2023-03-16 | End: 2023-03-16

## 2023-03-16 RX ORDER — NAPROXEN 500 MG/1
500 TABLET ORAL 2 TIMES DAILY PRN
Qty: 20 TABLET | Refills: 0 | Status: SHIPPED | OUTPATIENT
Start: 2023-03-16 | End: 2023-03-23

## 2023-03-16 NOTE — DISCHARGE INSTRUCTIONS
Increase water intake 2-3 liters daily   If you develop any neurological changes or visual changes please go to the emergency room.

## 2023-03-16 NOTE — ED INITIAL ASSESSMENT (HPI)
Pt with intermittent pain to top of head since yesterday - sometimes pulsating, sharp, high pressure    No fever/vom/vision changes/sob/chest/dizziness/injury

## 2023-12-05 ENCOUNTER — TELEPHONE (OUTPATIENT)
Dept: FAMILY MEDICINE CLINIC | Facility: CLINIC | Age: 43
End: 2023-12-05

## 2023-12-05 DIAGNOSIS — E78.1 HYPERTRIGLYCERIDEMIA: ICD-10-CM

## 2023-12-05 DIAGNOSIS — E53.8 VITAMIN B12 DEFICIENCY: ICD-10-CM

## 2023-12-05 DIAGNOSIS — R73.01 ELEVATED FASTING GLUCOSE: ICD-10-CM

## 2023-12-05 DIAGNOSIS — E55.9 VITAMIN D DEFICIENCY: ICD-10-CM

## 2023-12-05 DIAGNOSIS — Z00.00 LABORATORY EXAM ORDERED AS PART OF ROUTINE GENERAL MEDICAL EXAMINATION: Primary | ICD-10-CM

## 2023-12-05 NOTE — TELEPHONE ENCOUNTER
Please enter lab orders for the patient's upcoming physical appointment. Physical scheduled: Your appointments       Date & Time Appointment Department [facility])    Jan 12, 2024 10:30 AM CST Adult Physical with Tina Butler MD [address] [place] [address] (Dannie Bowers)    PLEASE NOTE - Most insurances allow a Complete Physical once every 366 days. Please schedule accordingly. Please arrive 15 minutes prior to your scheduled appointment. Please also bring your Insurance card, Photo ID, and your medication bottles or a list of your current medication. If you no longer require this appointment, please contact your physician office to cancel. Bernardo Flores [facility] [id number] [address] [phone]           [facility])     The patient has been notified to complete fasting labs prior to their physical appointment.

## 2023-12-14 DIAGNOSIS — E78.1 HYPERTRIGLYCERIDEMIA: ICD-10-CM

## 2023-12-15 RX ORDER — SIMVASTATIN 20 MG
20 TABLET ORAL NIGHTLY
Qty: 90 TABLET | Refills: 3 | Status: SHIPPED | OUTPATIENT
Start: 2023-12-15

## 2023-12-15 NOTE — TELEPHONE ENCOUNTER
Requested Prescriptions     Pending Prescriptions Disp Refills    SIMVASTATIN 20 MG Oral Tab [Pharmacy Med Name: SIMVASTATIN 20MG TABLETS] 90 tablet 0     Sig: TAKE 1 TABLET(20 MG) BY MOUTH EVERY NIGHT     LOV Visit date not found     Patient was asked to follow-up in: 6 months    Appointment scheduled: 1/12/2024 Ajit Solares MD     Medication refilled per protocol.

## 2024-01-09 ENCOUNTER — LAB ENCOUNTER (OUTPATIENT)
Dept: LAB | Age: 44
End: 2024-01-09
Attending: FAMILY MEDICINE
Payer: COMMERCIAL

## 2024-01-09 DIAGNOSIS — E55.9 VITAMIN D DEFICIENCY: ICD-10-CM

## 2024-01-09 DIAGNOSIS — E78.1 HYPERTRIGLYCERIDEMIA: ICD-10-CM

## 2024-01-09 DIAGNOSIS — E53.8 VITAMIN B12 DEFICIENCY: ICD-10-CM

## 2024-01-09 DIAGNOSIS — R73.01 ELEVATED FASTING GLUCOSE: ICD-10-CM

## 2024-01-09 DIAGNOSIS — Z00.00 LABORATORY EXAM ORDERED AS PART OF ROUTINE GENERAL MEDICAL EXAMINATION: ICD-10-CM

## 2024-01-09 LAB
ALBUMIN SERPL-MCNC: 4.1 G/DL (ref 3.4–5)
ALBUMIN/GLOB SERPL: 1.2 {RATIO} (ref 1–2)
ALP LIVER SERPL-CCNC: 68 U/L
ALT SERPL-CCNC: 60 U/L
ANION GAP SERPL CALC-SCNC: 2 MMOL/L (ref 0–18)
AST SERPL-CCNC: 24 U/L (ref 15–37)
BILIRUB SERPL-MCNC: 0.4 MG/DL (ref 0.1–2)
BUN BLD-MCNC: 19 MG/DL (ref 9–23)
CALCIUM BLD-MCNC: 8.8 MG/DL (ref 8.5–10.1)
CHLORIDE SERPL-SCNC: 105 MMOL/L (ref 98–112)
CHOLEST SERPL-MCNC: 164 MG/DL (ref ?–200)
CO2 SERPL-SCNC: 30 MMOL/L (ref 21–32)
CREAT BLD-MCNC: 0.93 MG/DL
EGFRCR SERPLBLD CKD-EPI 2021: 104 ML/MIN/1.73M2 (ref 60–?)
ERYTHROCYTE [DISTWIDTH] IN BLOOD BY AUTOMATED COUNT: 13.2 %
EST. AVERAGE GLUCOSE BLD GHB EST-MCNC: 120 MG/DL (ref 68–126)
FASTING PATIENT LIPID ANSWER: YES
FASTING STATUS PATIENT QL REPORTED: YES
GLOBULIN PLAS-MCNC: 3.3 G/DL (ref 2.8–4.4)
GLUCOSE BLD-MCNC: 90 MG/DL (ref 70–99)
HBA1C MFR BLD: 5.8 % (ref ?–5.7)
HCT VFR BLD AUTO: 44.3 %
HDLC SERPL-MCNC: 40 MG/DL (ref 40–59)
HGB BLD-MCNC: 14.4 G/DL
LDLC SERPL CALC-MCNC: 101 MG/DL (ref ?–100)
MCH RBC QN AUTO: 26.6 PG (ref 26–34)
MCHC RBC AUTO-ENTMCNC: 32.5 G/DL (ref 31–37)
MCV RBC AUTO: 81.9 FL
NONHDLC SERPL-MCNC: 124 MG/DL (ref ?–130)
OSMOLALITY SERPL CALC.SUM OF ELEC: 286 MOSM/KG (ref 275–295)
PLATELET # BLD AUTO: 248 10(3)UL (ref 150–450)
POTASSIUM SERPL-SCNC: 4.2 MMOL/L (ref 3.5–5.1)
PROT SERPL-MCNC: 7.4 G/DL (ref 6.4–8.2)
RBC # BLD AUTO: 5.41 X10(6)UL
SODIUM SERPL-SCNC: 137 MMOL/L (ref 136–145)
TRIGL SERPL-MCNC: 128 MG/DL (ref 30–149)
TSI SER-ACNC: 1.13 MIU/ML (ref 0.36–3.74)
VIT B12 SERPL-MCNC: 236 PG/ML (ref 193–986)
VIT D+METAB SERPL-MCNC: 27.3 NG/ML (ref 30–100)
VLDLC SERPL CALC-MCNC: 21 MG/DL (ref 0–30)
WBC # BLD AUTO: 6.1 X10(3) UL (ref 4–11)

## 2024-01-09 PROCEDURE — 85027 COMPLETE CBC AUTOMATED: CPT

## 2024-01-09 PROCEDURE — 82306 VITAMIN D 25 HYDROXY: CPT

## 2024-01-09 PROCEDURE — 83036 HEMOGLOBIN GLYCOSYLATED A1C: CPT

## 2024-01-09 PROCEDURE — 80053 COMPREHEN METABOLIC PANEL: CPT

## 2024-01-09 PROCEDURE — 82607 VITAMIN B-12: CPT

## 2024-01-09 PROCEDURE — 84443 ASSAY THYROID STIM HORMONE: CPT

## 2024-01-09 PROCEDURE — 36415 COLL VENOUS BLD VENIPUNCTURE: CPT

## 2024-01-09 PROCEDURE — 80061 LIPID PANEL: CPT

## 2024-01-10 ENCOUNTER — PATIENT MESSAGE (OUTPATIENT)
Dept: FAMILY MEDICINE CLINIC | Facility: CLINIC | Age: 44
End: 2024-01-10

## 2024-01-10 NOTE — TELEPHONE ENCOUNTER
From: Michelle Garcia  To: Arthur Lucas  Sent: 1/10/2024 2:52 PM CST  Subject: Employer Health Screening    [facility] Dr. Lucas,  Happy New Year! I would appreciate it if you could have the attached health screening form completed during my Annual Checkup visit on Friday.  Thanks, Michelle

## 2024-02-06 ENCOUNTER — OFFICE VISIT (OUTPATIENT)
Dept: FAMILY MEDICINE CLINIC | Facility: CLINIC | Age: 44
End: 2024-02-06
Payer: COMMERCIAL

## 2024-02-06 VITALS
SYSTOLIC BLOOD PRESSURE: 110 MMHG | BODY MASS INDEX: 33.04 KG/M2 | OXYGEN SATURATION: 98 % | WEIGHT: 186.5 LBS | HEART RATE: 76 BPM | HEIGHT: 63 IN | RESPIRATION RATE: 16 BRPM | DIASTOLIC BLOOD PRESSURE: 70 MMHG

## 2024-02-06 DIAGNOSIS — M79.645 FINGER PAIN, LEFT: ICD-10-CM

## 2024-02-06 DIAGNOSIS — R73.03 PREDIABETES: ICD-10-CM

## 2024-02-06 DIAGNOSIS — Z00.00 ANNUAL PHYSICAL EXAM: Primary | ICD-10-CM

## 2024-02-06 DIAGNOSIS — L98.9 SCALP LESION: ICD-10-CM

## 2024-02-06 DIAGNOSIS — E78.1 PURE HYPERGLYCERIDEMIA: ICD-10-CM

## 2024-02-06 PROCEDURE — 99213 OFFICE O/P EST LOW 20 MIN: CPT | Performed by: FAMILY MEDICINE

## 2024-02-06 PROCEDURE — 99396 PREV VISIT EST AGE 40-64: CPT | Performed by: FAMILY MEDICINE

## 2024-02-06 NOTE — PROGRESS NOTES
Chief Complaint   Patient presents with    Physical     Patient here for physical      HPI:   Michelle Garcia is a 43 year old male who presents for a complete physical exam.     Last colonoscopy:  N/a - screening at 45yo- in April.   Last PSA:  N/a - screening at 49   Immunizations: TDaP 2017.      HLD - on simvastatin and fish oil.  Lipids controlled on recent labs.     Addictions - was unable to seek out providers previously, but now can see mental health specialist and plans on doing this.    The condition is a little better.  He is doing some research to find the right therapist, and planning to start this hopefully shortly.      L 5th finger - 2-3 months of pain anytime its being used.      Sugars - pre-DM, with A1C 5.8.      Wt Readings from Last 6 Encounters:   02/06/24 186 lb 8 oz (84.6 kg)   03/16/23 178 lb (80.7 kg)   11/30/22 176 lb (79.8 kg)   11/18/22 174 lb 3.2 oz (79 kg)   04/14/22 171 lb (77.6 kg)   04/07/22 165 lb (74.8 kg)     Body mass index is 33.04 kg/m².     Chemistry Labs:   Lab Results   Component Value Date/Time    GLU 90 01/09/2024 08:11 AM     01/09/2024 08:11 AM    K 4.2 01/09/2024 08:11 AM     01/09/2024 08:11 AM    CO2 30.0 01/09/2024 08:11 AM    CREATSERUM 0.93 01/09/2024 08:11 AM    CA 8.8 01/09/2024 08:11 AM    ALB 4.1 01/09/2024 08:11 AM    TP 7.4 01/09/2024 08:11 AM    ALKPHO 68 01/09/2024 08:11 AM    AST 24 01/09/2024 08:11 AM    ALT 60 01/09/2024 08:11 AM    BILT 0.4 01/09/2024 08:11 AM          Cholesterol  (most recent labs)   Lab Results   Component Value Date/Time    CHOLEST 164 01/09/2024 08:11 AM    HDL 40 01/09/2024 08:11 AM     (H) 01/09/2024 08:11 AM    TRIG 128 01/09/2024 08:11 AM      No results found for: \"PSA\"      Current Outpatient Medications   Medication Sig Dispense Refill    simvastatin 20 MG Oral Tab Take 1 tablet (20 mg total) by mouth nightly. 90 tablet 3    Melatonin 10 MG Oral Cap Take by mouth.      Vitamin D, Cholecalciferol, 50 MCG  (2000 UT) Oral Cap Take 6,000 Units by mouth daily.        omega-3 fatty acids (FISH OIL) 1000 MG Oral Cap Take 1,000 mg by mouth in the morning and 1,000 mg at noon and 1,000 mg in the evening.        Past Medical History:   Diagnosis Date    Hyperlipidemia     Localized superficial swelling, mass, or lump 7/6/12    multiple skin nodules    Pure hyperglyceridemia 7/2/12    Skin disorder 7/2/12    pruritic hyperpigmented nodules      No past surgical history on file.   Family History   Problem Relation Age of Onset    Hypertension Mother     Genetic Disease Son         gene mutation - pelvic kidney    Neurological Disorder Son         seizures      Social History:  Social History     Socioeconomic History    Marital status:    Tobacco Use    Smoking status: Never    Smokeless tobacco: Never   Vaping Use    Vaping Use: Never used   Substance and Sexual Activity    Alcohol use: No     Alcohol/week: 0.0 standard drinks of alcohol    Drug use: No   Other Topics Concern     Service No    Caffeine Concern Yes     Comment: 2xdaily, coffee    Exercise Yes     Comment: 1-2xweek    Seat Belt Yes      Occ: [place].   : yes. Children: 2 boys.  13, 16  Exercise: not currently.   Diet: visit to [place], eating more in the last couple months.       REVIEW OF SYSTEMS:     All systems reviewed, negative other than noted above.    EXAM:   /70   Pulse 76   Resp 16   Ht 5' 3\" (1.6 m)   Wt 186 lb 8 oz (84.6 kg)   SpO2 98%   BMI 33.04 kg/m²   Body mass index is 33.04 kg/m².     General appearance: alert, appears stated age and cooperative  Eyes: conjunctivae/corneas clear. PERRL, EOM's intact.   Ears: normal TM's and external ear canals both ears  Neck: no adenopathy, no JVD, supple, symmetrical, trachea midline and thyroid not enlarged, symmetric, no tenderness/mass/nodules  Lungs: clear to auscultation bilaterally  Heart: S1, S2 normal, no murmur, click, rub or gallop, regular rate and rhythm  Abdomen:  soft, non-tender; bowel sounds normal; no masses,  no organomegaly  Extremities: extremities normal, atraumatic, no cyanosis or edema  Pulses: 2+ and symmetric  Neurologic: Alert and oriented X 3, normal strength and tone. Normal symmetric reflexes. Normal coordination and gait   Scalp - small irritation L scalp.  Decreased hair growth in this region.    ASSESSMENT AND PLAN:     Michelle Garcia was seen in the office today:  had concerns including Physical (Patient here for physical).    1. Annual physical exam  Overall fair  Weight creeping up.  Sugars up  Need to get this down.  Focus on food portions, exercise as able  C-scope or colon screening due later in the year    2. Pure hyperglyceridemia  On statin and fish oil  Effective control  Med refilled previously.     3. Prediabetes  A1C stable, but in the pre-DM range  Need to get weight down.     5. Finger pain, left  L 5th finger pain  Hyperextension, but pains with any use  Plan to see specialist.  Referral placed  - ORTHOPEDIC - INTERNAL    6. Scalp lesion  Benign appearing  Use steroid cream on area to calm down the inflammation.        Arthur Lucas M.D.   EMG 3  02/06/24    RTC annually    Discussed with the patient in person that additional conditions managed outside of the normal physical, and this may incur co-payments as dictated by their insurance provider.

## 2024-05-06 ENCOUNTER — OFFICE VISIT (OUTPATIENT)
Dept: FAMILY MEDICINE CLINIC | Facility: CLINIC | Age: 44
End: 2024-05-06
Payer: COMMERCIAL

## 2024-05-06 VITALS
DIASTOLIC BLOOD PRESSURE: 74 MMHG | SYSTOLIC BLOOD PRESSURE: 106 MMHG | WEIGHT: 184 LBS | HEIGHT: 63 IN | OXYGEN SATURATION: 100 % | BODY MASS INDEX: 32.6 KG/M2 | HEART RATE: 87 BPM

## 2024-05-06 DIAGNOSIS — Z12.11 SCREEN FOR COLON CANCER: ICD-10-CM

## 2024-05-06 DIAGNOSIS — Z71.84 TRAVEL ADVICE ENCOUNTER: Primary | ICD-10-CM

## 2024-05-06 DIAGNOSIS — Z23 NEED FOR VACCINATION: ICD-10-CM

## 2024-05-06 PROCEDURE — 90471 IMMUNIZATION ADMIN: CPT | Performed by: STUDENT IN AN ORGANIZED HEALTH CARE EDUCATION/TRAINING PROGRAM

## 2024-05-06 PROCEDURE — 90734 MENACWYD/MENACWYCRM VACC IM: CPT | Performed by: STUDENT IN AN ORGANIZED HEALTH CARE EDUCATION/TRAINING PROGRAM

## 2024-05-06 PROCEDURE — 99213 OFFICE O/P EST LOW 20 MIN: CPT | Performed by: STUDENT IN AN ORGANIZED HEALTH CARE EDUCATION/TRAINING PROGRAM

## 2024-05-06 RX ORDER — CHLORHEXIDINE GLUCONATE ORAL RINSE 1.2 MG/ML
15 SOLUTION DENTAL 2 TIMES DAILY
COMMUNITY

## 2024-05-06 NOTE — PROGRESS NOTES
[facility] - [facility]    Chief Complaint   Patient presents with    Follow - Up     Neeing meningitis vaccine for travel to [place] [facility]        HPI:   Michelle Garcia is 44 year old patient presenting for the followin. Travel visit. Need for vaccination. Reports he needs menactra to travel to [place] [facility]. He is not due for a physical. NO other vaccines are due.    2. Colonoscopy due - age 44. He is agreeable to scheduling this and order is placed.     PMH:  Patient Active Problem List   Diagnosis    Skin disorder    Pure hyperglyceridemia    Episodic tension-type headache, not intractable    Encounter for therapeutic drug monitoring    Obesity (BMI 30.0-34.9)    Nonspecific abnormal electrocardiogram (ECG) (EKG)    Prediabetes    Vitamin D deficiency    Vitamin B12 deficiency    Sleep disturbance    Chronic fatigue    Disturbance of skin sensation     Past Medical History:    Hyperlipidemia    Localized superficial swelling, mass, or lump    multiple skin nodules    Pure hyperglyceridemia    Skin disorder    pruritic hyperpigmented nodules          SH: reviewed     FH: reviewed        ROS: full 10 point review of systems done and otherwise negative.      Healthcare Maintenance:       PE:  Vital Signs    24 1512   BP: 106/74   Pulse: 87   PainSc: 0 - (None)     Wt Readings from Last 3 Encounters:   24 184 lb (83.5 kg)   24 186 lb 8 oz (84.6 kg)   23 178 lb (80.7 kg)     Body mass index is 32.59 kg/m².     Physical Exam:  GEN: Well-appearing, NAD, nontoxic  CARD: Regular rate  PULM: Comfortable WOB  ABD: nd  EXT: No gross deformity  NEURO: no gross deficit  PSYCH: normal affect, thought process linear     No visits with results within 4 Week(s) from this visit.   Latest known visit with results is:   [facility] Lab Encounter on 2024   Component Date Value Ref Range Status    WBC 2024 6.1  4.0 - 11.0 x10(3) uL Final    RBC 2024 5.41  4.30 - 5.70 x10(6)uL Final     HGB 01/09/2024 14.4  13.0 - 17.5 g/dL Final    HCT 01/09/2024 44.3  39.0 - 53.0 % Final    PLT 01/09/2024 248.0  150.0 - 450.0 10(3)uL Final    MCV 01/09/2024 81.9  80.0 - 100.0 fL Final    MCH 01/09/2024 26.6  26.0 - 34.0 pg Final    MCHC 01/09/2024 32.5  31.0 - 37.0 g/dL Final    RDW 01/09/2024 13.2  % Final    Glucose 01/09/2024 90  70 - 99 mg/dL Final    Sodium 01/09/2024 137  136 - 145 mmol/L Final    Potassium 01/09/2024 4.2  3.5 - 5.1 mmol/L Final    Chloride 01/09/2024 105  98 - 112 mmol/L Final    CO2 01/09/2024 30.0  21.0 - 32.0 mmol/L Final    Anion Gap 01/09/2024 2  0 - 18 mmol/L Final    BUN 01/09/2024 19  9 - 23 mg/dL Final    Creatinine 01/09/2024 0.93  0.70 - 1.30 mg/dL Final    Calcium, Total 01/09/2024 8.8  8.5 - 10.1 mg/dL Final    Calculated Osmolality 01/09/2024 286  275 - 295 mOsm/kg Final    eGFR-Cr 01/09/2024 104  >=60 mL/min/1.73m2 Final    AST 01/09/2024 24  15 - 37 U/L Final    ALT 01/09/2024 60  16 - 61 U/L Final    Alkaline Phosphatase 01/09/2024 68  45 - 117 U/L Final    Bilirubin, Total 01/09/2024 0.4  0.1 - 2.0 mg/dL Final    Total Protein 01/09/2024 7.4  6.4 - 8.2 g/dL Final    Albumin 01/09/2024 4.1  3.4 - 5.0 g/dL Final    Globulin  01/09/2024 3.3  2.8 - 4.4 g/dL Final    A/G Ratio 01/09/2024 1.2  1.0 - 2.0 Final    Patient Fasting for CMP? 01/09/2024 Yes   Final    HgbA1C 01/09/2024 5.8 (H)  <5.7 % Final    Estimated Average Glucose 01/09/2024 120  68 - 126 mg/dL Final    Cholesterol, Total 01/09/2024 164  <200 mg/dL Final    HDL Cholesterol 01/09/2024 40  40 - 59 mg/dL Final    Triglycerides 01/09/2024 128  30 - 149 mg/dL Final    LDL Cholesterol 01/09/2024 101 (H)  <100 mg/dL Final    VLDL 01/09/2024 21  0 - 30 mg/dL Final    Non HDL Chol 01/09/2024 124  <130 mg/dL Final    Patient Fasting for Lipid? 01/09/2024 Yes   Final    TSH 01/09/2024 1.130  0.358 - 3.740 mIU/mL Final    Vitamin B12 01/09/2024 236  193 - 986 pg/mL Final    Vitamin D, 25OH, Total 01/09/2024 27.3 (L)  30.0 -  100.0 ng/mL Final        A/P: Michelle Garcia is 44 year old presenting for the followin. Travel out of the country. Reports he needs menactra to travel to [place]. He is not due for a physical. NO other vaccines are due.    2. Colonoscopy due - age 44. He is agreeable to scheduling this and order is placed.        Outpatient Encounter Medications as of 2024   Medication Sig Dispense Refill    chlorhexidine gluconate 0.12 % Mouth/Throat Solution Use as directed 15 mL in the mouth or throat 2 (two) times daily.      simvastatin 20 MG Oral Tab Take 1 tablet (20 mg total) by mouth nightly. 90 tablet 3    Melatonin 10 MG Oral Cap Take by mouth.      Vitamin D, Cholecalciferol, 50 MCG (2000 UT) Oral Cap Take 6,000 Units by mouth daily.        omega-3 fatty acids (FISH OIL) 1000 MG Oral Cap Take 1,000 mg by mouth in the morning and 1,000 mg at noon and 1,000 mg in the evening.       No facility-administered encounter medications on file as of 2024.     Side effects, risks and benefits of medications were explained.  The patient or responsible adult showed the ability to learn, asked appropriate questions.  There were no barriers to learning and they verbalized understanding of the treatment plan.     Medication list provided to patient and /or family member.        Kimberly Dewitt MD

## 2024-05-21 ENCOUNTER — OFFICE VISIT (OUTPATIENT)
Dept: FAMILY MEDICINE CLINIC | Facility: CLINIC | Age: 44
End: 2024-05-21

## 2024-05-21 VITALS
SYSTOLIC BLOOD PRESSURE: 108 MMHG | DIASTOLIC BLOOD PRESSURE: 76 MMHG | BODY MASS INDEX: 32.43 KG/M2 | WEIGHT: 183 LBS | HEIGHT: 63 IN | HEART RATE: 78 BPM | RESPIRATION RATE: 16 BRPM

## 2024-05-21 DIAGNOSIS — N50.811 TESTICULAR PAIN, RIGHT: ICD-10-CM

## 2024-05-21 DIAGNOSIS — S31.119A LACERATION OF GROIN, INITIAL ENCOUNTER: Primary | ICD-10-CM

## 2024-05-21 DIAGNOSIS — Z12.11 SCREEN FOR COLON CANCER: ICD-10-CM

## 2024-05-21 PROCEDURE — 99214 OFFICE O/P EST MOD 30 MIN: CPT | Performed by: FAMILY MEDICINE

## 2024-05-21 RX ORDER — CLOTRIMAZOLE AND BETAMETHASONE DIPROPIONATE 10; .64 MG/G; MG/G
1 CREAM TOPICAL 2 TIMES DAILY
Qty: 45 G | Refills: 0 | Status: SHIPPED | OUTPATIENT
Start: 2024-05-21

## 2024-05-21 NOTE — PROGRESS NOTES
Chief Complaint   Patient presents with    Groin Pain     Having discomfort on the right testicle, feeling some burning on the inner thigh, started 4-5 days ago       HPI:   Michelle Garcia is a 44 year old male who presents to the office for eval of R sided groin pain.  Has upcoming trip to [place]  and is planning on doing a lot of walking in the heat.     A few days ago started having pain in the R groin.  He noticed a red spot in the groin.    Notes in the past few weeks switched from boxers to briefs.    The pain has been pretty persistent.  In the entire groin area.    He has noticed the R testicle seems smaller than the L testicle.      No issues with urination or bowels.        REVIEW OF SYSTEMS:   Pertinent items are noted in HPI.  EXAM:   /76   Pulse 78   Resp 16   Ht 5' 3\" (1.6 m)   Wt 183 lb (83 kg)   BMI 32.42 kg/m²     General appearance - alert, well appearing, and in no distress  Abdomen - soft, nontender, nondistended, no masses or organomegaly  HERNIA EXAM: no hernias found on exam   - 1cm laceration in the R groin.  Scrotum normal appearing.  L teste smooth, non tender.  R teste smooth as well, but noted to be slightly smaller than the L teste and tender.   ASSESSMENT AND PLAN:     Michelle Garcia was seen in the office today:  had concerns including Groin Pain (Having discomfort on the right testicle, feeling some burning on the inner thigh, started 4-5 days ago ).    1. Laceration of groin, initial encounter  Suspect this is the source of the pain.  Unclear mechanism of injury, but may be related to switching from boxers to briefs where this would rub more  Use barrier creams on the area to allow to heal.  Rx for clotrimazole-betamethazone.  If costly, would prescribe these separately.    - clotrimazole-betamethasone 1-0.05 % External Cream; Apply 1 Application topically 2 (two) times daily.  Dispense: 45 g; Refill: 0    2. Testicular pain, right  Tender and decreased size  Will  check scrotum US.   - US SCROTUM W/ DOPPLER (CPT=93975/30596); Future    3. Screen for colon cancer  Interested in FIT stool testing  Card given  - Occult Blood, Fecal, Immunoassay (Blue cards) [E]; Future      Arthur Lucas M.D.   EMG 3  05/21/24

## 2024-05-24 ENCOUNTER — HOSPITAL ENCOUNTER (OUTPATIENT)
Dept: ULTRASOUND IMAGING | Age: 44
Discharge: HOME | End: 2024-05-24
Attending: FAMILY MEDICINE

## 2024-05-24 DIAGNOSIS — N50.811 TESTICULAR PAIN, RIGHT: ICD-10-CM

## 2024-05-24 PROCEDURE — 76870 US EXAM SCROTUM: CPT | Performed by: FAMILY MEDICINE

## 2024-05-24 PROCEDURE — 93975 VASCULAR STUDY: CPT | Performed by: FAMILY MEDICINE

## 2024-10-02 ENCOUNTER — OFFICE VISIT (OUTPATIENT)
Dept: FAMILY MEDICINE CLINIC | Facility: CLINIC | Age: 44
End: 2024-10-02
Payer: COMMERCIAL

## 2024-10-02 VITALS
WEIGHT: 186.38 LBS | SYSTOLIC BLOOD PRESSURE: 110 MMHG | BODY MASS INDEX: 33.02 KG/M2 | RESPIRATION RATE: 16 BRPM | HEIGHT: 63 IN | HEART RATE: 107 BPM | DIASTOLIC BLOOD PRESSURE: 68 MMHG | OXYGEN SATURATION: 96 %

## 2024-10-02 DIAGNOSIS — M79.662 PAIN IN SHIN, LEFT: ICD-10-CM

## 2024-10-02 DIAGNOSIS — R23.8 SKIN SENSITIVITY: Primary | ICD-10-CM

## 2024-10-02 PROCEDURE — 99213 OFFICE O/P EST LOW 20 MIN: CPT | Performed by: FAMILY MEDICINE

## 2024-10-02 RX ORDER — TRIAMCINOLONE ACETONIDE 1 MG/G
CREAM TOPICAL 2 TIMES DAILY PRN
Qty: 60 G | Refills: 3 | Status: SHIPPED | OUTPATIENT
Start: 2024-10-02

## 2024-10-02 RX ORDER — PREDNISONE 20 MG/1
40 TABLET ORAL DAILY
Qty: 10 TABLET | Refills: 0 | Status: SHIPPED | OUTPATIENT
Start: 2024-10-02 | End: 2024-10-07

## 2024-10-02 NOTE — PROGRESS NOTES
Chief Complaint   Patient presents with    Leg or Foot Injury     Patient here for shin pain      HPI:   Michelle Garcia is a 44 year old male who presents to the office for eval of pain in the L leg.    On the lateral knee, very tender.  Any touch of the skin causes sharp pain.    Worse when the knee is flexed and the skin is pulled over the lateral kneecap laterally.  Only occurs when he does this movement.   No trauma or injury or exercising.  Nothing to account for why this started .    Shin - pulsing pain in the L inner shin.  Electrical type feeling.  Happening 1-2 times a day for 2 weeks.    No injury he can recall.    Always pain in the same spot.      Does do prayers several times a day in a kneeling position.        REVIEW OF SYSTEMS:   Pertinent items are noted in HPI.  EXAM:   There were no vitals taken for this visit.    General appearance - alert, well appearing, and in no distress  Extremities - L leg - knee normal appearance.  Callous on lateral and inferionr knee, but same as on R side.  Pain is elicited by bending knee over 90 degrees and pulling the skin over the lateral patella in a lateral direction.  Any other stretching or skin pulling direction causes no pain.     In L shin, again normal appearance.  Slightly dilated vein seen superficial to the area of the pain, but non tender, no inflammation.  Normal exam today.  Pain occured medial to the medial aspect of the tibia about 1/3 of the way up from the ankle. This pain occurs on its own, cannot be elicited.    ASSESSMENT AND PLAN:     Michelle Garcia was seen in the office today:  had concerns including Leg or Foot Injury (Patient here for shin pain).    1. Skin sensitivity  Unclear cause  No trauma or disturbance of the skin.  Callous present, but also present on the R side and its non tender.   Trial of topical steroid cream and prednisone.  Avoid irritating the skin as this pain is triggered  - triamcinolone 0.1 % External Cream; Apply  topically 2 (two) times daily as needed.  Dispense: 60 g; Refill: 3    2. Pain in shin, left  Non triggered pain, occurring at random 1-2 times a day for a few weeks  Yesterday occurred a lot more.  Cannot think of any reason why the frequency was increased.  Electric type pain.    Skin normal.  Not able to reproduce the pain  Use steroid cream on the area, prednisone to help with any inflammation.  May need to see neurology and consider EMG if the pain persists.    Oddly specific region of pain, does not travel further down or up leg   - triamcinolone 0.1 % External Cream; Apply topically 2 (two) times daily as needed.  Dispense: 60 g; Refill: 3  - predniSONE 20 MG Oral Tab; Take 2 tablets (40 mg total) by mouth daily for 5 days.  Dispense: 10 tablet; Refill: 0      Arthur Lucas M.D.   EMG 3  10/02/24

## 2024-10-14 ENCOUNTER — PATIENT MESSAGE (OUTPATIENT)
Dept: FAMILY MEDICINE CLINIC | Facility: CLINIC | Age: 44
End: 2024-10-14

## 2024-10-14 DIAGNOSIS — R23.8 SKIN SENSITIVITY: Primary | ICD-10-CM

## 2024-10-14 DIAGNOSIS — M79.662 PAIN IN SHIN, LEFT: ICD-10-CM

## 2024-10-14 NOTE — TELEPHONE ENCOUNTER
Skin sensitivity and pain in left shin are not improving.    Pt requests next step    Routed to Dr Lucas

## 2024-10-31 ENCOUNTER — HOSPITAL ENCOUNTER (OUTPATIENT)
Age: 44
Discharge: HOME | End: 2024-10-31
Attending: EMERGENCY MEDICINE
Payer: COMMERCIAL

## 2024-10-31 ENCOUNTER — OFFICE VISIT (OUTPATIENT)
Dept: NEUROLOGY | Facility: CLINIC | Age: 44
End: 2024-10-31
Payer: COMMERCIAL

## 2024-10-31 VITALS
BODY MASS INDEX: 33.31 KG/M2 | RESPIRATION RATE: 16 BRPM | WEIGHT: 188 LBS | HEIGHT: 63 IN | HEART RATE: 79 BPM | SYSTOLIC BLOOD PRESSURE: 100 MMHG | OXYGEN SATURATION: 97 % | DIASTOLIC BLOOD PRESSURE: 74 MMHG

## 2024-10-31 VITALS
HEART RATE: 77 BPM | SYSTOLIC BLOOD PRESSURE: 103 MMHG | BODY MASS INDEX: 32.78 KG/M2 | OXYGEN SATURATION: 98 % | WEIGHT: 185 LBS | TEMPERATURE: 98 F | HEIGHT: 63 IN | RESPIRATION RATE: 16 BRPM | DIASTOLIC BLOOD PRESSURE: 77 MMHG

## 2024-10-31 DIAGNOSIS — M79.605 PAIN OF LEFT LOWER EXTREMITY: Primary | ICD-10-CM

## 2024-10-31 DIAGNOSIS — G57.82 SAPHENOUS NEURALGIA, LEFT: Primary | ICD-10-CM

## 2024-10-31 PROCEDURE — 99213 OFFICE O/P EST LOW 20 MIN: CPT

## 2024-10-31 PROCEDURE — 99204 OFFICE O/P NEW MOD 45 MIN: CPT | Performed by: OTHER

## 2024-10-31 PROCEDURE — 99212 OFFICE O/P EST SF 10 MIN: CPT

## 2024-10-31 RX ORDER — GABAPENTIN 100 MG/1
CAPSULE ORAL
Qty: 270 CAPSULE | Refills: 1 | Status: SHIPPED | OUTPATIENT
Start: 2024-10-31

## 2024-10-31 NOTE — PROGRESS NOTES
[facility] [place]   Neurology    Michelle Garcia Patient Status:  No patient class for patient encounter    1980 MRN [id number]   Location [facility] PCP Arthur Lucas MD              HPI:   Michelle Garcia is a(n) 44 year old male who presents at the request of Arthur Lucas MD for evaluation of 8 week history of left leg sensations. It was intermittent, now more frequent. It is electric sensations in the left medial shin, in a two inch area. In , he had pins and needles in the left toes and sole of the foot. Reports this improved when he lost weight. Denies back pain or weakness. Denies positional triggers. Can wake him up. No numbness. No injury. Pain in LLE is episodic, lasts a second, comes every 10-15 minutes. Has allodynia over the left knee if it's bent.     Pertinent imaging and laboratory work-up:   Component      Latest Ref Rng 2024   HEMOGLOBIN A1c      <5.7 % 5.8 (H)    ESTIMATED AVERAGE GLUCOSE      68 - 126 mg/dL 120    TSH      0.358 - 3.740 mIU/mL 1.130    Vitamin B12      193 - 986 pg/mL 236       Legend:  (H) High  Past Medical History:  Past Medical History:    Hyperlipidemia    Localized superficial swelling, mass, or lump    multiple skin nodules    Pure hyperglyceridemia    Skin disorder    pruritic hyperpigmented nodules        Past Surgical History:  History reviewed. No pertinent surgical history.    Family History:  family history includes Genetic Disease in his son; Hypertension in his mother; Neurological Disorder in his son.      Social History:   reports that he has never smoked. He has never used smokeless tobacco. He reports that he does not drink alcohol and does not use drugs.    Allergies:  Allergies[1]    MEDICATIONS:    Current Outpatient Medications:     gabapentin 100 MG Oral Cap, Week 1- take 1 cap three times a day, Week 2- take 2 caps three times a day, Week 3 and on- if tolerating take 3  caps three times a day, Disp: 270 capsule, Rfl: 1    simvastatin 20 MG Oral Tab, Take 1 tablet (20 mg total) by mouth nightly., Disp: 90 tablet, Rfl: 3    Vitamin D, Cholecalciferol, 50 MCG (2000 UT) Oral Cap, Take 6,000 Units by mouth daily.  , Disp: , Rfl:     omega-3 fatty acids (FISH OIL) 1000 MG Oral Cap, Take 1,000 mg by mouth in the morning and 1,000 mg at noon and 1,000 mg in the evening., Disp: , Rfl:       Review of Systems:   A comprehensive 10 point review of systems was completed.     Pertinent positives and negatives noted in the HPI.         PHYSICAL EXAM:   Neurological Exam  Vitals  Vitals:    10/31/24 1353   BP: 100/74   Pulse: 79   Resp: 16     General Appearance: Patient is a 44 year old male in no acute distress  Cardiac: Normal rate & regular rhythm  Skin: There are no rashes or other skin lesions.  Musculoskeletal: There is no scoliosis, or joint deformities  Neurologic examination:  Mental status: Patient is alert, attentive, and oriented x 3. Language is coherent and fluent without aphasia. Memory, comprehension and ability to follow commands were intact.   Cranial nerves II-XII: Optic discs were sharp. Pupils were round and reacted to light. Extraocular movements were full. There was no face, jaw, palate or tongue weakness or atrophy. Facial sensation was normal. Hearing was grossly intact. Shoulder shrug was normal.   Motor exam revealed normal muscle bulk and tone. No atrophy or fasciculations. Manual muscle testing revealed MRC grade 5/5 strength throughout including proximal and distal muscles of the arms and legs.  Deep tendon reflexes were 2 at the biceps, brachioradialis, triceps, knee jerk, and ankle jerk. Plantar responses were flexor bilaterally.   Sensory exam revealed normal light touch perception. Vibratory perception and proprioception were intact at the toes. Pinprick and temperature were normal. Romberg sign was absent.  Complex motor skills revealed normal coordination.  Finger-nose-finger intact.   Gait was narrow and stable, was able to walk on heels, toes and tandem without any difficulty.     ASSESSMENT/ACTIVE PROBLEM LIST:     Encounter Diagnosis   Name Primary?    Saphenous neuralgia, left Yes       Discussion/Plan:  Atypical left saphenous neuralgia v very unlikely lumbar radiculopathy. Normal motor and sensory exam  Left leg paresthesias in small area of left medial shin   Obtain EMG  Obtain US left lower leg, attention along course of saphenous nerve  Not positional and no knee injury, but avoid excessive knee bending   Start gabapentin 100mg BID and as below    Requested Prescriptions     Signed Prescriptions Disp Refills    gabapentin 100 MG Oral Cap 270 capsule 1     Sig: Week 1- take 1 cap three times a day, Week 2- take 2 caps three times a day, Week 3 and on- if tolerating take 3 caps three times a day          We discussed in depth regarding the diagnosis, prognosis, treatment. The patient was given ample opportunity to ask questions. All questions and concerns were addressed.     Adeline Kong,   Neuromuscular and General Neurology  [facility]             [1] No Known Allergies

## 2024-10-31 NOTE — PATIENT INSTRUCTIONS
Refill policies:    Allow 2-3 business days for refills; controlled substances may take longer.  Contact your pharmacy at least 5 days prior to running out of medication and have them send an electronic request or submit request through the “request refill” option in your [employer] account.  Refills are not addressed on weekends; covering physicians do not authorize routine medications on weekends.  No narcotics or controlled substances are refilled after noon on Fridays or by on call physicians.  By law, narcotics must be electronically prescribed.  A 30 day supply with no refills is the maximum allowed.  If your prescription is due for a refill, you may be due for a follow up appointment.  To best provide you care, patients receiving routine medications need to be seen at least once a year.  Patients receiving narcotic/controlled substance medications need to be seen at least once every 3 months.  In the event that your preferred pharmacy does not have the requested medication in stock (e.g. Backordered), it is your responsibility to find another pharmacy that has the requested medication available.  We will gladly send a new prescription to that pharmacy at your request.    Scheduling Tests:    If your physician has ordered radiology tests such as MRI or CT scans, please contact Central Scheduling at [phone] right away to schedule the test.  Once scheduled, the [facility] Centralized Referral Team will work with your insurance carrier to obtain pre-certification or prior authorization.  Depending on your insurance carrier, approval may take 3-10 days.  It is highly recommended patients assure they have received an authorization before having a test performed.  If test is done without insurance authorization, patient may be responsible for the entire amount billed.      Precertification and Prior Authorizations:  If your physician has recommended that you have a procedure or additional testing performed the [facility]  Centralized Referral Team will contact your insurance carrier to obtain pre-certification or prior authorization.    You are strongly encouraged to contact your insurance carrier to verify that your procedure/test has been approved and is a COVERED benefit.  Although the [facility] Centralized Referral Team does its due diligence, the insurance carrier gives the disclaimer that \"Although the procedure is authorized, this does not guarantee payment.\"    Ultimately the patient is responsible for payment.   Thank you for your understanding in this matter.  Paperwork Completion:  If you require FMLA or disability paperwork for your recovery, please make sure to either drop it off or have it faxed to our office at [phone]. Be sure the form has your name and date of birth on it.  The form will be faxed to our Forms Department and they will complete it for you.  There is a 25$ fee for all forms that need to be filled out.  Please be aware there is a 10-14 day turnaround time.  You will need to sign a release of information (MOIZ) form if your paperwork does not come with one.  You may call the Forms Department with any questions at [phone].  Their fax number is [phone].

## 2024-10-31 NOTE — DISCHARGE INSTRUCTIONS
Continue ibuprofen 400mg every six hours as needed  Follow up with neurology as recommended by primary care

## 2024-10-31 NOTE — ED INITIAL ASSESSMENT (HPI)
Pt states having left shin pain that started 8 weeks ago, pain used to be intermittent but since last night its been consistent. Denies any injury to left shin.

## 2024-10-31 NOTE — PROGRESS NOTES
Patient is here with his wife today.  Left leg has electric sensation for about 8 weeks now  Last night it was continuously happening.

## 2024-10-31 NOTE — ED PROVIDER NOTES
Patient Seen in: [facility]      History   No chief complaint on file.    Stated Complaint: left shin pain    Subjective:   HPI      45 yo male with left lower leg pain that has been going on for at least 8 weeks. No initial trauma. There is a focal area of pain to the distal right lower leg. Pain is intermittent and sharp, shooting, burning pain. No specific exacerbating factors. Has been seen by primary care and referred to neurology but has not made the appointment yet. Was on a short course of prednisone but no improvement. No back pain. No leg weakness.     Objective:     Past Medical History:    Hyperlipidemia    Localized superficial swelling, mass, or lump    multiple skin nodules    Pure hyperglyceridemia    Skin disorder    pruritic hyperpigmented nodules              History reviewed. No pertinent surgical history.             Social History     Socioeconomic History    Marital status:    Tobacco Use    Smoking status: Never    Smokeless tobacco: Never   Vaping Use    Vaping status: Never Used   Substance and Sexual Activity    Alcohol use: No     Alcohol/week: 0.0 standard drinks of alcohol    Drug use: No   Other Topics Concern     Service No    Caffeine Concern Yes     Comment: 2xdaily, coffee    Exercise Yes     Comment: 1-2xweek    Seat Belt Yes              Review of Systems    Positive for stated complaint: left shin pain  Other systems are as noted in HPI.  Constitutional and vital signs reviewed.      All other systems reviewed and negative except as noted above.    Physical Exam     ED Triage Vitals [10/31/24 0819]   /77   Pulse 77   Resp 16   Temp 98.2 °F (36.8 °C)   Temp src Oral   SpO2 98 %   O2 Device None (Room air)       Current Vitals:   Vital Signs  BP: 103/77  Pulse: 77  Resp: 16  Temp: 98.2 °F (36.8 °C)  Temp src: Oral    Oxygen Therapy  SpO2: 98 %  O2 Device: None (Room air)        Physical Exam  Vitals and nursing note reviewed.    Constitutional:       Appearance: Normal appearance. He is well-developed.   HENT:      Head: Normocephalic and atraumatic.   Cardiovascular:      Rate and Rhythm: Normal rate and regular rhythm.   Pulmonary:      Effort: Pulmonary effort is normal. No respiratory distress.   Musculoskeletal:      Comments: Normal leg inspection. No reproducible tenderness. No swelling. Distal neurovascular intact. No calf tenderness.    Skin:     General: Skin is warm and dry.      Capillary Refill: Capillary refill takes less than 2 seconds.   Neurological:      General: No focal deficit present.      Mental Status: He is alert.      Sensory: No sensory deficit.   Psychiatric:         Mood and Affect: Mood normal.         Behavior: Behavior normal.            ED Course   Labs Reviewed - No data to display                MDM           Medical Decision Making  Peripheral neuropathy, muscle pain with spasm, vascular disease all in differential. Ibuprofen 400mg every six hours as needed. Follow up with neurology.     Disposition and Plan     Clinical Impression:  1. Pain of left lower extremity         Disposition:  Discharge  10/31/2024  8:39 am    Follow-up:  Arthur Lucas MD  [address] [place] [id number]  [phone]      As needed          Medications Prescribed:  Discharge Medication List as of 10/31/2024  8:43 AM              Supplementary Documentation:

## 2025-01-10 ENCOUNTER — HOSPITAL ENCOUNTER (OUTPATIENT)
Dept: ULTRASOUND IMAGING | Facility: HOSPITAL | Age: 45
Discharge: HOME | End: 2025-01-10
Attending: Other
Payer: COMMERCIAL

## 2025-01-10 DIAGNOSIS — G57.82 SAPHENOUS NEURALGIA, LEFT: ICD-10-CM

## 2025-01-10 PROCEDURE — 76881 US COMPL JOINT R-T W/IMG: CPT | Performed by: OTHER

## 2025-02-06 DIAGNOSIS — G57.82 SAPHENOUS NEURALGIA, LEFT: Primary | ICD-10-CM

## 2025-02-07 RX ORDER — GABAPENTIN 100 MG/1
CAPSULE ORAL
Status: CANCELLED | OUTPATIENT
Start: 2025-02-07

## 2025-02-07 NOTE — TELEPHONE ENCOUNTER
Patient is requesting medication to be changed to 300 mg due to insurance reasons.       Medication: gabapentin 100 MG Oral Cap      Date of last refill: 10/31/2024 (#270/1)  [place]     Last office visit: 10/31/2024  Due back to clinic per last office note:  Around 01/31/2025  Date next office visit scheduled:    Future Appointments   Date Time Provider Department Center   2/18/2025  8:15 AM Jannette Kong DO [facility]           Last OV note recommendation:    ASSESSMENT/ACTIVE PROBLEM LIST:           Encounter Diagnosis   Name Primary?    Saphenous neuralgia, left Yes         Discussion/Plan:  Atypical left saphenous neuralgia v very unlikely lumbar radiculopathy. Normal motor and sensory exam  Left leg paresthesias in small area of left medial shin   Obtain EMG  Obtain US left lower leg, attention along course of saphenous nerve  Not positional and no knee injury, but avoid excessive knee bending   Start gabapentin 100mg BID and as below

## 2025-02-08 RX ORDER — GABAPENTIN 300 MG/1
300 CAPSULE ORAL 3 TIMES DAILY
Qty: 270 CAPSULE | Refills: 3 | Status: SHIPPED | OUTPATIENT
Start: 2025-02-08

## 2025-02-18 ENCOUNTER — OFFICE VISIT (OUTPATIENT)
Dept: NEUROLOGY | Facility: CLINIC | Age: 45
End: 2025-02-18
Payer: COMMERCIAL

## 2025-02-18 VITALS
WEIGHT: 193 LBS | BODY MASS INDEX: 34 KG/M2 | SYSTOLIC BLOOD PRESSURE: 128 MMHG | RESPIRATION RATE: 16 BRPM | DIASTOLIC BLOOD PRESSURE: 74 MMHG | HEART RATE: 76 BPM

## 2025-02-18 DIAGNOSIS — G57.82 SAPHENOUS NEURALGIA, LEFT: Primary | ICD-10-CM

## 2025-02-18 DIAGNOSIS — R20.2 LEFT LEG PARESTHESIAS: ICD-10-CM

## 2025-02-18 PROCEDURE — 99214 OFFICE O/P EST MOD 30 MIN: CPT | Performed by: OTHER

## 2025-02-18 RX ORDER — DULOXETIN HYDROCHLORIDE 30 MG/1
CAPSULE, DELAYED RELEASE ORAL
Qty: 60 CAPSULE | Refills: 1 | Status: SHIPPED | OUTPATIENT
Start: 2025-02-18

## 2025-02-18 NOTE — PATIENT INSTRUCTIONS
Refill policies:    Allow 2-3 business days for refills; controlled substances may take longer.  Contact your pharmacy at least 5 days prior to running out of medication and have them send an electronic request or submit request through the “request refill” option in your [employer] account.  Refills are not addressed on weekends; covering physicians do not authorize routine medications on weekends.  No narcotics or controlled substances are refilled after noon on Fridays or by on call physicians.  By law, narcotics must be electronically prescribed.  A 30 day supply with no refills is the maximum allowed.  If your prescription is due for a refill, you may be due for a follow up appointment.  To best provide you care, patients receiving routine medications need to be seen at least once a year.  Patients receiving narcotic/controlled substance medications need to be seen at least once every 3 months.  In the event that your preferred pharmacy does not have the requested medication in stock (e.g. Backordered), it is your responsibility to find another pharmacy that has the requested medication available.  We will gladly send a new prescription to that pharmacy at your request.    Scheduling Tests:    If your physician has ordered radiology tests such as MRI or CT scans, please contact Central Scheduling at [phone] right away to schedule the test.  Once scheduled, the [facility] Centralized Referral Team will work with your insurance carrier to obtain pre-certification or prior authorization.  Depending on your insurance carrier, approval may take 3-10 days.  It is highly recommended patients assure they have received an authorization before having a test performed.  If test is done without insurance authorization, patient may be responsible for the entire amount billed.      Precertification and Prior Authorizations:  If your physician has recommended that you have a procedure or additional testing performed the [facility]  Centralized Referral Team will contact your insurance carrier to obtain pre-certification or prior authorization.    You are strongly encouraged to contact your insurance carrier to verify that your procedure/test has been approved and is a COVERED benefit.  Although the [facility] Centralized Referral Team does its due diligence, the insurance carrier gives the disclaimer that \"Although the procedure is authorized, this does not guarantee payment.\"    Ultimately the patient is responsible for payment.   Thank you for your understanding in this matter.  Paperwork Completion:  If you require FMLA or disability paperwork for your recovery, please make sure to either drop it off or have it faxed to our office at [phone]. Be sure the form has your name and date of birth on it.  The form will be faxed to our Forms Department and they will complete it for you.  There is a 25$ fee for all forms that need to be filled out.  Please be aware there is a 10-14 day turnaround time.  You will need to sign a release of information (MOIZ) form if your paperwork does not come with one.  You may call the Forms Department with any questions at [phone].  Their fax number is [phone].        Start new medication.  Decrease gabapentin to 300mg twice daily x 2 weeks, then if pain is tolerable, decrease to once daily x 1-2 weeks, then stop.

## 2025-02-18 NOTE — PROGRESS NOTES
[facility]   Neurology    Michelle Garcia Patient Status:  No patient class for patient encounter    1980 MRN [id number]   Location [facility] PCP Arthur Lucas MD              HPI:   Michelle Garcia is a(n) 44 year old male who presents at the request of Arthur Lucas MD for evaluation of 8 week history of left leg sensations. It was intermittent, now more frequent. It is electric sensations in the left medial shin, in a two inch area. In , he had pins and needles in the left toes and sole of the foot. Reports this improved when he lost weight. Denies back pain or weakness. Denies positional triggers. Can wake him up. No numbness. No injury. Pain in LLE is episodic, lasts a second, comes every 10-15 minutes. Has allodynia over the left knee if it's bent.   Interim  Since last visit. Patient completed US but not EMG. Gabapentin 300mg TID is helping his left leg paresthesias. Missed a few doses, and pain came back. Denies any new areas of paresthesias. In the last 2-3 months, intermittently gets left lower back/hip pain/locking sensation. Bending over can trigger it.       Pertinent imaging and laboratory work-up:   US L LE 2025  CONCLUSION:  There is no anatomic abnormality along the expected course of the saphenous nerve.         Component      Latest Ref Rng 2024   HEMOGLOBIN A1c      <5.7 % 5.8 (H)    ESTIMATED AVERAGE GLUCOSE      68 - 126 mg/dL 120    TSH      0.358 - 3.740 mIU/mL 1.130    Vitamin B12      193 - 986 pg/mL 236       Legend:  (H) High  Past Medical History:  Past Medical History:    Hyperlipidemia    Localized superficial swelling, mass, or lump    multiple skin nodules    Pure hyperglyceridemia    Skin disorder    pruritic hyperpigmented nodules        Past Surgical History:  No past surgical history on file.    Family History:  family history includes Genetic Disease in his son; Hypertension in his  mother; Neurological Disorder in his son.      Social History:   reports that he has never smoked. He has never used smokeless tobacco. He reports that he does not drink alcohol and does not use drugs.    Allergies:  Allergies[1]    MEDICATIONS:    Current Outpatient Medications:     DULoxetine (CYMBALTA) 30 MG Oral Cap DR Particles, Take 1 capsule daily x 2 weeks. If tolerating, increase to 2 capsules daily, Disp: 60 capsule, Rfl: 1    gabapentin 300 MG Oral Cap, Take 1 capsule (300 mg total) by mouth in the morning, at noon, and at bedtime., Disp: 270 capsule, Rfl: 3    simvastatin 20 MG Oral Tab, Take 1 tablet (20 mg total) by mouth nightly., Disp: 90 tablet, Rfl: 3    Vitamin D, Cholecalciferol, 50 MCG (2000 UT) Oral Cap, Take 6,000 Units by mouth daily.  , Disp: , Rfl:     omega-3 fatty acids (FISH OIL) 1000 MG Oral Cap, Take 1,000 mg by mouth in the morning and 1,000 mg at noon and 1,000 mg in the evening., Disp: , Rfl:       Review of Systems:   A comprehensive 10 point review of systems was completed.     Pertinent positives and negatives noted in the HPI.         PHYSICAL EXAM:   Neurological Exam  Vitals  Vitals:    02/18/25 0824   BP: 128/74   Pulse: 76   Resp: 16       General Appearance: Patient is a 44 year old male in no acute distress  Cardiac: Normal rate & regular rhythm  Skin: There are no rashes or other skin lesions.  Musculoskeletal: There is no scoliosis, or joint deformities  Neurologic examination:  Mental status: Patient is alert, attentive, and oriented x 3. Language is coherent and fluent without aphasia. Memory, comprehension and ability to follow commands were intact.   Cranial nerves II-XII: Optic discs were sharp. Pupils were round and reacted to light. Extraocular movements were full. There was no face, jaw, palate or tongue weakness or atrophy. Facial sensation was normal. Hearing was grossly intact. Shoulder shrug was normal.   Motor exam revealed normal muscle bulk and tone. No  atrophy or fasciculations. Manual muscle testing revealed MRC grade 5/5 strength throughout including proximal and distal muscles of the arms and legs.  Deep tendon reflexes were 2 at the biceps, brachioradialis, triceps, knee jerk, and ankle jerk. Plantar responses were flexor bilaterally.   Sensory exam revealed normal light touch perception. Vibratory perception and proprioception were intact at the toes. Pinprick and temperature were normal. Romberg sign was absent.  Complex motor skills revealed normal coordination. Finger-nose-finger intact.   Gait was narrow and stable, was able to walk on heels, toes and tandem without any difficulty.     ASSESSMENT/ACTIVE PROBLEM LIST:     Encounter Diagnoses   Name Primary?    Saphenous neuralgia, left Yes    Left leg paresthesias          Discussion/Plan:  Atypical left saphenous neuralgia v. unlikely lumbar radiculopathy. Normal motor and sensory exam  Leg US reviewed  Obtain MRI lumbar to eval L4 foraminal space, as symptoms are atypical, and there is a small possibility that they are not neuropathic  If negative, do EMG  Having mood side effects on gabapentin. Lower gabapentin 300mg BID x 1-2 weeks, then 300mg daily  Start cymbalta 30mg then 60mg. Discussed a few possible side effects  Concern for constipation with nortriptyline, has hemorrhoids, will avoid for now, unless limited options    Requested Prescriptions     Signed Prescriptions Disp Refills    DULoxetine (CYMBALTA) 30 MG Oral Cap DR Particles 60 capsule 1     Sig: Take 1 capsule daily x 2 weeks. If tolerating, increase to 2 capsules daily          We discussed in depth regarding the diagnosis, prognosis, treatment. The patient was given ample opportunity to ask questions. All questions and concerns were addressed. 30 minutes were spent on this encounter, which included obtaining and reviewing history, examining the patient, reviewing and interpreting results, building a treatment plan, discussing treatment  options, discussing medication instructions, educating the patient/family/caregiver, and completing documentation.       Adeline Kong DO  Neuromuscular and General Neurology  [facility]           [1] No Known Allergies

## 2025-05-21 DIAGNOSIS — E78.1 HYPERTRIGLYCERIDEMIA: ICD-10-CM

## 2025-05-23 RX ORDER — SIMVASTATIN 20 MG
20 TABLET ORAL NIGHTLY
Qty: 90 TABLET | Refills: 3 | Status: SHIPPED | OUTPATIENT
Start: 2025-05-23

## 2025-05-23 NOTE — TELEPHONE ENCOUNTER
Failed protocol    Requested Prescriptions     Pending Prescriptions Disp Refills    SIMVASTATIN 20 MG Oral Tab [Pharmacy Med Name: SIMVASTATIN 20MG TABLETS] 90 tablet 3     Sig: TAKE 1 TABLET(20 MG) BY MOUTH EVERY NIGHT        Last refill: 12/15/23 90 tabs 3 refills    Last Appointment: LOV 10/2/2024     Next Appointment: Visit date not found

## 2025-05-27 ENCOUNTER — HOSPITAL ENCOUNTER (OUTPATIENT)
Dept: MRI IMAGING | Age: 45
Discharge: HOME | End: 2025-05-27
Attending: Other
Payer: COMMERCIAL

## 2025-05-27 DIAGNOSIS — R20.2 LEFT LEG PARESTHESIAS: ICD-10-CM

## 2025-05-27 PROCEDURE — 72148 MRI LUMBAR SPINE W/O DYE: CPT | Performed by: OTHER

## 2025-06-01 ENCOUNTER — PATIENT MESSAGE (OUTPATIENT)
Dept: FAMILY MEDICINE CLINIC | Facility: CLINIC | Age: 45
End: 2025-06-01

## 2025-06-01 DIAGNOSIS — Z00.00 LABORATORY EXAM ORDERED AS PART OF ROUTINE GENERAL MEDICAL EXAMINATION: Primary | ICD-10-CM

## 2025-06-01 DIAGNOSIS — E55.9 VITAMIN D DEFICIENCY: ICD-10-CM

## 2025-06-01 DIAGNOSIS — E53.8 VITAMIN B12 DEFICIENCY: ICD-10-CM

## 2025-06-02 ENCOUNTER — LAB ENCOUNTER (OUTPATIENT)
Dept: LAB | Age: 45
End: 2025-06-02
Attending: FAMILY MEDICINE
Payer: COMMERCIAL

## 2025-06-02 DIAGNOSIS — E53.8 VITAMIN B12 DEFICIENCY: ICD-10-CM

## 2025-06-02 DIAGNOSIS — Z00.00 LABORATORY EXAM ORDERED AS PART OF ROUTINE GENERAL MEDICAL EXAMINATION: ICD-10-CM

## 2025-06-02 DIAGNOSIS — E55.9 VITAMIN D DEFICIENCY: ICD-10-CM

## 2025-06-02 LAB
ALBUMIN SERPL-MCNC: 4.7 G/DL (ref 3.2–4.8)
ALBUMIN/GLOB SERPL: 1.8 {RATIO} (ref 1–2)
ALP LIVER SERPL-CCNC: 80 U/L (ref 45–117)
ALT SERPL-CCNC: 32 U/L (ref 10–49)
ANION GAP SERPL CALC-SCNC: 8 MMOL/L (ref 0–18)
AST SERPL-CCNC: 28 U/L (ref ?–34)
BASOPHILS # BLD AUTO: 0.06 X10(3) UL (ref 0–0.2)
BASOPHILS NFR BLD AUTO: 0.9 %
BILIRUB SERPL-MCNC: 0.4 MG/DL (ref 0.3–1.2)
BUN BLD-MCNC: 18 MG/DL (ref 9–23)
CALCIUM BLD-MCNC: 9.4 MG/DL (ref 8.7–10.6)
CHLORIDE SERPL-SCNC: 105 MMOL/L (ref 98–112)
CHOLEST SERPL-MCNC: 160 MG/DL (ref ?–200)
CO2 SERPL-SCNC: 27 MMOL/L (ref 21–32)
CREAT BLD-MCNC: 1.12 MG/DL (ref 0.7–1.3)
EGFRCR SERPLBLD CKD-EPI 2021: 82 ML/MIN/1.73M2 (ref 60–?)
EOSINOPHIL # BLD AUTO: 0.2 X10(3) UL (ref 0–0.7)
EOSINOPHIL NFR BLD AUTO: 3.1 %
ERYTHROCYTE [DISTWIDTH] IN BLOOD BY AUTOMATED COUNT: 13.3 %
EST. AVERAGE GLUCOSE BLD GHB EST-MCNC: 123 MG/DL (ref 68–126)
FASTING PATIENT LIPID ANSWER: YES
FASTING STATUS PATIENT QL REPORTED: YES
GLOBULIN PLAS-MCNC: 2.6 G/DL (ref 2–3.5)
GLUCOSE BLD-MCNC: 93 MG/DL (ref 70–99)
HBA1C MFR BLD: 5.9 % (ref ?–5.7)
HCT VFR BLD AUTO: 42.8 % (ref 39–53)
HDLC SERPL-MCNC: 42 MG/DL (ref 40–59)
HGB BLD-MCNC: 13.9 G/DL (ref 13–17.5)
IMM GRANULOCYTES # BLD AUTO: 0.01 X10(3) UL (ref 0–1)
IMM GRANULOCYTES NFR BLD: 0.2 %
LDLC SERPL CALC-MCNC: 100 MG/DL (ref ?–100)
LYMPHOCYTES # BLD AUTO: 2.63 X10(3) UL (ref 1–4)
LYMPHOCYTES NFR BLD AUTO: 41.2 %
MCH RBC QN AUTO: 26.9 PG (ref 26–34)
MCHC RBC AUTO-ENTMCNC: 32.5 G/DL (ref 31–37)
MCV RBC AUTO: 82.9 FL (ref 80–100)
MONOCYTES # BLD AUTO: 0.48 X10(3) UL (ref 0.1–1)
MONOCYTES NFR BLD AUTO: 7.5 %
NEUTROPHILS # BLD AUTO: 3.01 X10 (3) UL (ref 1.5–7.7)
NEUTROPHILS # BLD AUTO: 3.01 X10(3) UL (ref 1.5–7.7)
NEUTROPHILS NFR BLD AUTO: 47.1 %
NONHDLC SERPL-MCNC: 118 MG/DL (ref ?–130)
OSMOLALITY SERPL CALC.SUM OF ELEC: 292 MOSM/KG (ref 275–295)
PLATELET # BLD AUTO: 206 10(3)UL (ref 150–450)
POTASSIUM SERPL-SCNC: 4 MMOL/L (ref 3.5–5.1)
PROT SERPL-MCNC: 7.3 G/DL (ref 5.7–8.2)
RBC # BLD AUTO: 5.16 X10(6)UL (ref 4.3–5.7)
SODIUM SERPL-SCNC: 140 MMOL/L (ref 136–145)
TRIGL SERPL-MCNC: 100 MG/DL (ref 30–149)
TSI SER-ACNC: 2.04 UIU/ML (ref 0.55–4.78)
VIT B12 SERPL-MCNC: 193 PG/ML (ref 211–911)
VIT D+METAB SERPL-MCNC: 51 NG/ML (ref 30–100)
VLDLC SERPL CALC-MCNC: 17 MG/DL (ref 0–30)
WBC # BLD AUTO: 6.4 X10(3) UL (ref 4–11)

## 2025-06-02 PROCEDURE — 84443 ASSAY THYROID STIM HORMONE: CPT

## 2025-06-02 PROCEDURE — 85025 COMPLETE CBC W/AUTO DIFF WBC: CPT

## 2025-06-02 PROCEDURE — 82306 VITAMIN D 25 HYDROXY: CPT

## 2025-06-02 PROCEDURE — 80053 COMPREHEN METABOLIC PANEL: CPT

## 2025-06-02 PROCEDURE — 83036 HEMOGLOBIN GLYCOSYLATED A1C: CPT

## 2025-06-02 PROCEDURE — 80061 LIPID PANEL: CPT

## 2025-06-02 PROCEDURE — 82607 VITAMIN B-12: CPT

## 2025-06-02 PROCEDURE — 36415 COLL VENOUS BLD VENIPUNCTURE: CPT

## 2025-06-12 ENCOUNTER — OFFICE VISIT (OUTPATIENT)
Dept: FAMILY MEDICINE CLINIC | Facility: CLINIC | Age: 45
End: 2025-06-12
Payer: COMMERCIAL

## 2025-06-12 VITALS
WEIGHT: 183.38 LBS | OXYGEN SATURATION: 96 % | HEART RATE: 85 BPM | SYSTOLIC BLOOD PRESSURE: 110 MMHG | HEIGHT: 63 IN | BODY MASS INDEX: 32.49 KG/M2 | RESPIRATION RATE: 16 BRPM | DIASTOLIC BLOOD PRESSURE: 72 MMHG

## 2025-06-12 DIAGNOSIS — R73.03 PREDIABETES: ICD-10-CM

## 2025-06-12 DIAGNOSIS — Z12.11 COLON CANCER SCREENING: ICD-10-CM

## 2025-06-12 DIAGNOSIS — E55.9 VITAMIN D DEFICIENCY: ICD-10-CM

## 2025-06-12 DIAGNOSIS — Z00.00 ANNUAL PHYSICAL EXAM: Primary | ICD-10-CM

## 2025-06-12 DIAGNOSIS — E78.1 HYPERTRIGLYCERIDEMIA: ICD-10-CM

## 2025-06-12 DIAGNOSIS — E53.8 VITAMIN B12 DEFICIENCY: ICD-10-CM

## 2025-06-12 RX ORDER — CLOBETASOL PROPIONATE 0.5 MG/ML
SOLUTION TOPICAL
COMMUNITY
Start: 2025-05-09

## 2025-06-12 RX ORDER — MINOCYCLINE HYDROCHLORIDE 100 MG/1
CAPSULE ORAL
COMMUNITY
Start: 2025-05-09

## 2025-06-12 NOTE — PROGRESS NOTES
Chief Complaint   Patient presents with    Well Adult     Patient here for physical       HPI:   Michelle Garcia is a 45 year old male who presents for a complete physical exam.     Last colonoscopy:  43yo, now due.    Last PSA:  N/a - screening at 49   Immunizations: TDaP 2017.     Sugar - A1C remains slightly elevated.  5.9.  has been 5.8 in the past.  Diet pretty good.  Depending on mood.      B12 - level a little low.  Sensations in his leg, like neuropathy.  On Cymbalta.  No antacid use.      HLD - controlled don the meds.  Taking simvastatin 20mg.      Wt Readings from Last 6 Encounters:   06/12/25 183 lb 6 oz (83.2 kg)   02/18/25 193 lb (87.5 kg)   10/31/24 188 lb (85.3 kg)   10/31/24 185 lb (83.9 kg)   10/02/24 186 lb 6 oz (84.5 kg)   05/21/24 183 lb (83 kg)     Body mass index is 32.48 kg/m².     Chemistry Labs:   Lab Results   Component Value Date/Time    GLU 93 06/02/2025 07:40 AM     06/02/2025 07:40 AM    K 4.0 06/02/2025 07:40 AM     06/02/2025 07:40 AM    CO2 27.0 06/02/2025 07:40 AM    CREATSERUM 1.12 06/02/2025 07:40 AM    CA 9.4 06/02/2025 07:40 AM    ALB 4.7 06/02/2025 07:40 AM    TP 7.3 06/02/2025 07:40 AM    ALKPHO 80 06/02/2025 07:40 AM    AST 28 06/02/2025 07:40 AM    ALT 32 06/02/2025 07:40 AM    BILT 0.4 06/02/2025 07:40 AM          Cholesterol  (most recent labs)   Lab Results   Component Value Date/Time    CHOLEST 160 06/02/2025 07:40 AM    HDL 42 06/02/2025 07:40 AM     (H) 06/02/2025 07:40 AM    TRIG 100 06/02/2025 07:40 AM      No results found for: \"PSA\"      Current Medications[1]   Past Medical History[2]   Past Surgical History[3]   Family History[4]   Social History:  Short Social Hx on File[5]     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    Exercise: sport.  Pickleball, soccer.  Diet: fair.  Tries to balance.      REVIEW OF SYSTEMS:     All systems reviewed, negative other than noted above.    EXAM:   /72   Pulse 85   Resp 16   Ht 5' 3\" (1.6  m)   Wt 183 lb 6 oz (83.2 kg)   SpO2 96%   BMI 32.48 kg/m²   Body mass index is 32.48 kg/m².     General appearance: alert, appears stated age and cooperative.  overweight  Eyes: conjunctivae/corneas clear. PERRL, EOM's intact.   Ears: normal TM's and external ear canals both ears  Neck: no adenopathy, no JVD, supple, symmetrical, trachea midline and thyroid not enlarged, symmetric, no tenderness/mass/nodules  Lungs: clear to auscultation bilaterally  Heart: S1, S2 normal, no murmur, click, rub or gallop, regular rate and rhythm  Abdomen: soft, non-tender; bowel sounds normal; no masses,  no organomegaly  Extremities: extremities normal, atraumatic, no cyanosis or edema  Pulses: 2+ and symmetric  Neurologic: Alert and oriented X 3, normal strength and tone. Normal symmetric reflexes. Normal coordination and gait     ASSESSMENT AND PLAN:     Michelle Garcia was seen in the office today:  had concerns including Well Adult (Patient here for physical ).    1. Annual physical exam  Overall well  Watching labs.  Stable overall  C-scope due  PSA at 49  Immunizations reviewed, UTD.     2. Colon cancer screening  Due  Referral placed  - GASTRO - INTERNAL    3. BMI 32.0-32.9,adult  Higher than desired.   Working on getting this down. Should help the sugar, lipids.   Will monitor.     4. Vitamin D deficiency  Normal on labs  Taking supplement.  Stable.     5. Vitamin B12 deficiency  Was off supplement  Low levels.   Recommending to go on supplement.  1,000mcg daily.   Will recheck levels in 2-3 months.   - Vitamin B12 [E]; Future    6. Hypertriglyceridemia  On statin and fish oil  Levels are controlled on recent blood work.   Stable.     7. Prediabetes  A1C up slightly  Need to be mindful.    Work on getting weight down some.           Arthur Lucas M.D.   [facility] 3  06/12/25        Note to patient: The 21 Century Cures Act makes medical notes like these available to patients in the interest of transparency.  However, be advised this is a medical document. It is intended as peer to peer communication. It is written in medical language and may contain abbreviations or verbiage that are unfamiliar. It may appear blunt or direct. Medical documents are intended to carry relevant information, facts as evident, and the clinical opinion of the practitioner.            [1]   Current Outpatient Medications   Medication Sig Dispense Refill    clobetasol 0.05 % External Solution       minocycline 100 MG Oral Cap       SIMVASTATIN 20 MG Oral Tab TAKE 1 TABLET(20 MG) BY MOUTH EVERY NIGHT 90 tablet 3    DULoxetine (CYMBALTA) 30 MG Oral Cap DR Particles Take 1 capsule daily x 2 weeks. If tolerating, increase to 2 capsules daily 60 capsule 1    Vitamin D, Cholecalciferol, 50 MCG (2000 UT) Oral Cap Take 6,000 Units by mouth daily.        omega-3 fatty acids (FISH OIL) 1000 MG Oral Cap Take 1,000 mg by mouth in the morning and 1,000 mg at noon and 1,000 mg in the evening.     [2]   Past Medical History:   Hyperlipidemia    Localized superficial swelling, mass, or lump    multiple skin nodules    Pure hyperglyceridemia    Skin disorder    pruritic hyperpigmented nodules   [3] No past surgical history on file.  [4]   Family History  Problem Relation Age of Onset    Hypertension Mother     Genetic Disease Son         gene mutation - pelvic kidney    Neurological Disorder Son         seizures   [5]   Social History  Socioeconomic History    Marital status:    Tobacco Use    Smoking status: Never    Smokeless tobacco: Never   Vaping Use    Vaping status: Never Used   Substance and Sexual Activity    Alcohol use: No     Alcohol/week: 0.0 standard drinks of alcohol    Drug use: No   Other Topics Concern     Service No    Caffeine Concern Yes     Comment: Soda and Coffee    Exercise Yes     Comment: 1-2xweek    Seat Belt Yes     Social Drivers of Health     Food Insecurity: No Food Insecurity (6/12/2025)    NCSS - Food Insecurity      Worried About Running Out of Food in the Last Year: No     Ran Out of Food in the Last Year: No   Transportation Needs: No Transportation Needs (6/12/2025)    NCSS - Transportation     Lack of Transportation: No   Housing Stability: Not At Risk (6/12/2025)    NCSS - Housing/Utilities     Has Housing: Yes     Worried About Losing Housing: No     Unable to Get Utilities: No

## 2025-06-24 ENCOUNTER — PATIENT OUTREACH (OUTPATIENT)
Dept: FAMILY MEDICINE CLINIC | Facility: CLINIC | Age: 45
End: 2025-06-24

## 2025-07-07 NOTE — TELEPHONE ENCOUNTER
Medication: DULOXETINE 30 MG Oral Cap DR Particles      Date of last refill: 02/18/2025 (#60/1)  [place]     Last office visit: 02/18/2025  Due back to clinic per last office note:  4 Months   Date next office visit scheduled:    No future appointments.        Last OV note recommendation:    ASSESSMENT/ACTIVE PROBLEM LIST:           Encounter Diagnoses   Name Primary?    Saphenous neuralgia, left Yes    Left leg paresthesias              Discussion/Plan:  Atypical left saphenous neuralgia v. unlikely lumbar radiculopathy. Normal motor and sensory exam  Leg US reviewed  Obtain MRI lumbar to eval L4 foraminal space, as symptoms are atypical, and there is a small possibility that they are not neuropathic  If negative, do EMG  Having mood side effects on gabapentin. Lower gabapentin 300mg BID x 1-2 weeks, then 300mg daily  Start cymbalta 30mg then 60mg. Discussed a few possible side effects  Concern for constipation with nortriptyline, has hemorrhoids, will avoid for now, unless limited options     Requested Prescriptions             Signed Prescriptions Disp Refills    DULoxetine (CYMBALTA) 30 MG Oral Cap DR Particles 60 capsule 1       Sig: Take 1 capsule daily x 2 weeks. If tolerating, increase to 2 capsules daily            We discussed in depth regarding the diagnosis, prognosis, treatment. The patient was given ample opportunity to ask questions. All questions and concerns were addressed. 30 minutes were spent on this encounter, which included obtaining and reviewing history, examining the patient, reviewing and interpreting results, building a treatment plan, discussing treatment options, discussing medication instructions, educating the patient/family/caregiver, and completing documentation.         Adeline Kong DO  Neuromuscular and General Neurology  [facility]

## 2025-07-08 RX ORDER — DULOXETIN HYDROCHLORIDE 30 MG/1
CAPSULE, DELAYED RELEASE ORAL
Qty: 30 CAPSULE | Refills: 5 | Status: SHIPPED | OUTPATIENT
Start: 2025-07-08

## (undated) DIAGNOSIS — L98.9 SKIN LESION OF SCALP: Primary | ICD-10-CM

## (undated) DIAGNOSIS — E78.1 HYPERTRIGLYCERIDEMIA: ICD-10-CM

## (undated) NOTE — Clinical Note
Thank you for referring Melanie Mejia to the [facility]. I met with him in consultation today. I have ordered labs, set up a nutrition consultation with our dietician, and completed an EKG.   He was started on phentermine for medication therapy and

## (undated) NOTE — MR AVS SNAPSHOT
[facility]  [place],  [address]  [address] [facility]  [phone]               Thank you for choosing us for your health care visit with [facility] 03 NURSE.   We are glad to serve you and happy to provide you with this summary [employer] questions? Call [phone] for help. [employer] is NOT to be used for urgent needs. For medical emergencies, dial 911.            Visit [facility] [employer] online at  [email or web address]

## (undated) NOTE — Clinical Note
Date: 4/25/2017    Patient Name: [facility]          To Whom it may concern: This letter has been written at the patient's request. The above patient was seen at the [facility] for treatment of a medical condition.     Rec

## (undated) NOTE — MR AVS SNAPSHOT
[facility] [address] [place] [id number] [facility] [address] [phone]               Thank you for choosing us for your health care visit with ALLISON Bennett.   We are glad to serve you and happy to provide you with this summary of your vis may be involved in prolonged cases. This may follow a cold or other upper respiratory illness. Your doctor can help you find relief. Read on to learn more.       What is acute sinusitis? Sinuses are air-filled spaces in the skull behind the face.  They are substitute for professional medical care. Always follow your healthcare professional's instructions.            Allergies as of Apr 09, 2017     No Known Allergies                Today's Vital Signs     BP Pulse Temp Weight          110/68 mmHg 72 98 °F (36 Summaries. If you've been to the Emergency Department or your doctor's office, you can view your past visit information in [employer] by going to Visits < Visit Summaries. [employer] questions? Call [phone] for help.   [employer] is NOT to be used for urge

## (undated) NOTE — Clinical Note
Patient was seen for their 1 month f/u at [facility] with a total weight loss of 6# since initial consult.

## (undated) NOTE — MR AVS SNAPSHOT
[address] [facility] [address]  [facility],  [address]  [address] [facility] [address] [place] [address]               Thank you for choosing us for your health care visit with DIANNE Martinez.   We are glad to serve you and happy to provide you with this constipation to one person may not be to another. Your healthcare provider may do tests to diagnose constipation. It depends on what he or she finds when evaluating you.     Symptoms of constipation include:  · Abdominal pain  · Bloating  · Vomiting  · Pain · Fluids. It's important to get enough fluids each day. Drink plenty of water when you eat more fiber. If you are on diet that limits the amount of fluid you can have, talk about this with your healthcare provider. · Regular exercise.  Check with your heal 118/80 mmHg 76 97.5 °F (36.4 °C) (Oral) 63.5\" 183 lb 3.2 oz 31.94 kg/m2         Current Medications          This list is accurate as of: 4/25/17 11:27 AM.  Always use your most recent med list.                Clindamycin Phosphate 1 % Soln   Apply to sc

## (undated) NOTE — MR AVS SNAPSHOT
[address] [facility] [address]  [facility],  [address] [facility] [id number]               Thank you for choosing us for your health care visit with Abhinav Richmond PA-C.   We are glad to serve you and happy to provide you with [facility] Wash hair daily. Lather onto scalp, leave on 3-5 minutes, then rinse   Commonly known as:  NIZORAL           naproxen 500 MG Tabs   Take 1 tablet (500 mg total) by mouth 2 (two) times daily with meals.    Commonly known as:  NAPROSYN           omega-3 fatt